# Patient Record
Sex: MALE | Race: BLACK OR AFRICAN AMERICAN | NOT HISPANIC OR LATINO | Employment: OTHER | URBAN - NONMETROPOLITAN AREA
[De-identification: names, ages, dates, MRNs, and addresses within clinical notes are randomized per-mention and may not be internally consistent; named-entity substitution may affect disease eponyms.]

---

## 2017-06-06 ENCOUNTER — HOSPITAL ENCOUNTER (EMERGENCY)
Facility: HOSPITAL | Age: 47
Discharge: HOME | End: 2017-06-06
Admitting: EMERGENCY MEDICINE

## 2017-06-06 VITALS
WEIGHT: 244 LBS | SYSTOLIC BLOOD PRESSURE: 153 MMHG | DIASTOLIC BLOOD PRESSURE: 90 MMHG | HEART RATE: 96 BPM | HEIGHT: 74 IN | BODY MASS INDEX: 31.32 KG/M2 | TEMPERATURE: 98.5 F | RESPIRATION RATE: 18 BRPM | OXYGEN SATURATION: 96 %

## 2017-06-06 DIAGNOSIS — S01.01XA SCALP LACERATION, INITIAL ENCOUNTER: Primary | ICD-10-CM

## 2017-06-06 PROCEDURE — 90715 TDAP VACCINE 7 YRS/> IM: CPT | Performed by: NURSE PRACTITIONER

## 2017-06-06 PROCEDURE — 25010000002 TDAP 5-2.5-18.5 LF-MCG/0.5 SUSPENSION: Performed by: NURSE PRACTITIONER

## 2017-06-06 PROCEDURE — 90471 IMMUNIZATION ADMIN: CPT | Performed by: NURSE PRACTITIONER

## 2017-06-06 PROCEDURE — 99282 EMERGENCY DEPT VISIT SF MDM: CPT

## 2017-06-06 RX ORDER — CEPHALEXIN 500 MG/1
500 CAPSULE ORAL 3 TIMES DAILY
Qty: 21 CAPSULE | Refills: 0 | Status: SHIPPED | OUTPATIENT
Start: 2017-06-06 | End: 2017-06-13

## 2017-06-06 RX ORDER — LIDOCAINE HYDROCHLORIDE AND EPINEPHRINE BITARTRATE 20; .01 MG/ML; MG/ML
10 INJECTION, SOLUTION SUBCUTANEOUS ONCE
Status: COMPLETED | OUTPATIENT
Start: 2017-06-06 | End: 2017-06-06

## 2017-06-06 RX ORDER — OXYCODONE AND ACETAMINOPHEN 7.5; 325 MG/1; MG/1
1 TABLET ORAL EVERY 4 HOURS PRN
Qty: 12 TABLET | Refills: 0 | Status: SHIPPED | OUTPATIENT
Start: 2017-06-06

## 2017-06-06 RX ADMIN — LIDOCAINE HYDROCHLORIDE,EPINEPHRINE BITARTRATE 10 ML: 20; .01 INJECTION, SOLUTION INFILTRATION; PERINEURAL at 15:47

## 2017-06-06 RX ADMIN — TETANUS TOXOID, REDUCED DIPHTHERIA TOXOID AND ACELLULAR PERTUSSIS VACCINE, ADSORBED 0.5 ML: 5; 2.5; 8; 8; 2.5 SUSPENSION INTRAMUSCULAR at 16:29

## 2017-06-06 NOTE — ED PROVIDER NOTES
Subjective   HPI Comments: Patient is a 47-year-old white male presents with laceration posterior head just prior to arrival.  He states he hit his head on a piece of tin and sustained a laceration. No loc    Patient is a 47 y.o. male presenting with skin laceration.   History provided by:  Patient   used: No    Laceration       Review of Systems   Constitutional: Negative.    HENT: Negative.    Eyes: Negative.    Respiratory: Negative.    Cardiovascular: Negative.    Gastrointestinal: Negative.    Endocrine: Negative.    Genitourinary: Negative.    Musculoskeletal: Negative.    Skin:        Pt presents after he hit his head on piece of tin just pta. He sustained laceration to posterior scalp. He denies loc or other injury   Allergic/Immunologic: Negative.    Neurological: Negative.    Hematological: Negative.    Psychiatric/Behavioral: Negative.    All other systems reviewed and are negative.      Past Medical History:   Diagnosis Date   • Carpal tunnel syndrome, bilateral     upper limbs   • Chronic back pain    • Cigarette nicotine dependence, uncomplicated    • Hypertension    • Obesity    • Overweight        No Known Allergies    Past Surgical History:   Procedure Laterality Date   • CLUB FOOT RELEASE     • KNEE ARTHROSCOPY Right        History reviewed. No pertinent family history.    Social History     Social History   • Marital status: Single     Spouse name: N/A   • Number of children: N/A   • Years of education: N/A     Social History Main Topics   • Smoking status: Current Every Day Smoker     Packs/day: 1.00   • Smokeless tobacco: None   • Alcohol use No   • Drug use: No   • Sexual activity: Defer     Other Topics Concern   • None     Social History Narrative   • None       Prior to Admission medications    Medication Sig Start Date End Date Taking? Authorizing Provider   Elastic Bandages & Supports [place]WRIST SPLINT[place] [facility] [place]     Historical Provider, MD       /90  Pulse 96  Temp 98.5  "°F (36.9 °C)  Resp 18  Ht 74\" (188 cm)  Wt 244 lb (111 kg)  SpO2 96%  BMI 31.33 kg/m2    Objective   Physical Exam   Constitutional: He is oriented to person, place, and time. He appears well-developed and well-nourished.   HENT:   Head: Normocephalic and atraumatic.   Irregular laceration to posterior scalp measures approx 2.5cm. Moderate amt of skin loss noted. Bleeding controlled   Eyes: Conjunctivae and EOM are normal. Pupils are equal, round, and reactive to light.   Neck: Normal range of motion. Neck supple.   Cardiovascular: Normal rate, regular rhythm, normal heart sounds and intact distal pulses.    Pulmonary/Chest: Effort normal and breath sounds normal.   Abdominal: Soft. Bowel sounds are normal.   Musculoskeletal: Normal range of motion.   Neurological: He is alert and oriented to person, place, and time. He has normal reflexes.   Skin: Skin is warm and dry.   Psychiatric: He has a normal mood and affect. His behavior is normal. Judgment and thought content normal.   Nursing note and vitals reviewed.      Laceration Repair  Date/Time: 6/6/2017 3:45 PM  Performed by: CARLOS HINES  Authorized by: CARLOS HINES   Consent: Verbal consent obtained. Written consent obtained.  Risks and benefits: risks, benefits and alternatives were discussed  Consent given by: patient  Patient understanding: patient states understanding of the procedure being performed  Patient consent: the patient's understanding of the procedure matches consent given  Procedure consent: procedure consent matches procedure scheduled  Relevant documents: relevant documents present and verified  Test results: test results available and properly labeled  Site marked: the operative site was marked  Imaging studies: imaging studies available  Patient identity confirmed: verbally with patient and arm band  Body area: head/neck  Location details: scalp  Laceration length: 2.5 cm  Tendon involvement: none  Nerve involvement: " none  Vascular damage: no  Anesthesia: local infiltration    Anesthesia:  Anesthesia: local infiltration  Local Anesthetic: lidocaine 1% with epinephrine   Anesthetic total: 4 mL  Sedation:  Patient sedated: no    Preparation: Patient was prepped and draped in the usual sterile fashion.  Irrigation solution: saline (hibiclens )  Amount of cleaning: standard (laceration irregular shaped with moderate tissue loss noted. )  Debridement: none  Degree of undermining: none  Number of sutures: 6  Technique: simple  Approximation: loose  Approximation difficulty: simple  Dressing: bacitiraicin/adaptic/bere.  Patient tolerance: Patient tolerated the procedure well with no immediate complications               Lab Results (last 24 hours)     ** No results found for the last 24 hours. **          No orders to display       ED Course  ED Course   Comment By Time   Antione report completed #[id number]. No signs of suspicious activity noted. Will write for small amt of pain medication for acute pain. Reviewed side effects and potential for abuse BROOKLYN Noriega 06/06 1620          MDM  Number of Diagnoses or Management Options  Scalp laceration, initial encounter: minor  Patient Progress  Patient progress: stable      Final diagnoses:   Scalp laceration, initial encounter          BROOKLYN Noriega  06/06/17 5165

## 2017-06-06 NOTE — ED NOTES
Patients head laceration cleaned with surgical scrub and sterile saline.  Small area on left hand clean and bacitracin applied.     Dahlia Martinez RN  06/06/17 0208

## 2017-06-06 NOTE — ED NOTES
Patient reports hitting his head on a piece of tin.  There is a laceration present on the back of his head approximately 3/4 in wide and 1/2 in tall.     Dahlia Martinez RN  06/06/17 9266

## 2017-06-14 ENCOUNTER — HOSPITAL ENCOUNTER (EMERGENCY)
Facility: HOSPITAL | Age: 47
Discharge: HOME | End: 2017-06-14
Admitting: NURSE PRACTITIONER

## 2017-06-14 VITALS
OXYGEN SATURATION: 97 % | WEIGHT: 240 LBS | RESPIRATION RATE: 21 BRPM | BODY MASS INDEX: 30.8 KG/M2 | SYSTOLIC BLOOD PRESSURE: 147 MMHG | TEMPERATURE: 98.4 F | HEART RATE: 94 BPM | HEIGHT: 74 IN | DIASTOLIC BLOOD PRESSURE: 89 MMHG

## 2017-06-14 DIAGNOSIS — Z48.02 VISIT FOR SUTURE REMOVAL: Primary | ICD-10-CM

## 2017-06-14 PROCEDURE — 99201: CPT

## 2017-06-15 NOTE — ED PROVIDER NOTES
Subjective   HPI Comments: Patient is 47-year-old white male presents here for suture removal from the scalp.  Laceration was repaired 8 days ago.  He has not had any complications since that time.    Patient is a 47 y.o. male presenting with suture removal.   History provided by:  Patient   used: No    Suture / Staple Removal       Review of Systems   Constitutional: Negative.    HENT: Negative.    Eyes: Negative.    Respiratory: Negative.    Cardiovascular: Negative.    Gastrointestinal: Negative.    Endocrine: Negative.    Genitourinary: Negative.    Musculoskeletal: Negative.    Skin:        Here for suture removal from scalp. Wound was repaired 8 days ago here in the er. He has not had any problems since wound repaired    Allergic/Immunologic: Negative.    Neurological: Negative.    Hematological: Negative.    Psychiatric/Behavioral: Negative.    All other systems reviewed and are negative.      Past Medical History:   Diagnosis Date   • Carpal tunnel syndrome, bilateral     upper limbs   • Chronic back pain    • Cigarette nicotine dependence, uncomplicated    • Hypertension    • Obesity    • Overweight        No Known Allergies    Past Surgical History:   Procedure Laterality Date   • CLUB FOOT RELEASE     • KNEE ARTHROSCOPY Right        History reviewed. No pertinent family history.    Social History     Social History   • Marital status: Single     Spouse name: N/A   • Number of children: N/A   • Years of education: N/A     Social History Main Topics   • Smoking status: Current Every Day Smoker     Packs/day: 1.00   • Smokeless tobacco: None   • Alcohol use No   • Drug use: No   • Sexual activity: Defer     Other Topics Concern   • None     Social History Narrative       Prior to Admission medications    Medication Sig Start Date End Date Taking? Authorizing Provider   cephalexin (KEFLEX) 500 MG capsule Take 1 capsule by mouth 3 (Three) Times a Day for 7 days. 6/6/17 6/13/17  Sonya Peralta  "BROOKLYN Bains   Elastic Bandages & Supports [place]ST SPLINT[place] [facility] [place]     Historical Provider, MD   oxyCODONE-acetaminophen (PERCOCET) 7.5-325 MG per tablet Take 1 tablet by mouth Every 4 (Four) Hours As Needed for Moderate Pain (4-6). 6/6/17   BROOKLYN Noriega       /89 (BP Location: Left arm, Patient Position: Sitting)  Pulse 94  Temp 98.4 °F (36.9 °C) (Temporal Artery )   Resp 21  Ht 74\" (188 cm)  Wt 240 lb (109 kg)  SpO2 97%  BMI 30.81 kg/m2    Objective   Physical Exam   Constitutional: He is oriented to person, place, and time. He appears well-developed and well-nourished.   HENT:   Head: Normocephalic and atraumatic.   Wound to posterior scalp- healed well. Mild scabbing noted. No signs of infection noted.    Eyes: EOM are normal.   Neck: Normal range of motion. Neck supple.   Cardiovascular: Normal rate, regular rhythm, normal heart sounds and intact distal pulses.    Pulmonary/Chest: Effort normal and breath sounds normal.   Musculoskeletal: Normal range of motion.   Neurological: He is alert and oriented to person, place, and time. He has normal reflexes.   Skin: Skin is warm and dry.   Psychiatric: He has a normal mood and affect. His behavior is normal. Judgment and thought content normal.   Nursing note and vitals reviewed.      Procedures         Lab Results (last 24 hours)     ** No results found for the last 24 hours. **          No orders to display       ED Course  ED Course          MDM  Number of Diagnoses or Management Options  Visit for suture removal: minor  Patient Progress  Patient progress: stable      Final diagnoses:   Visit for suture removal          BROOKLYN Noriega  06/15/17 1312    "

## 2017-08-31 ENCOUNTER — HOSPITAL ENCOUNTER (INPATIENT)
Age: 47
LOS: 2 days | Discharge: HOME | DRG: 203 | End: 2017-09-02
Attending: EMERGENCY MEDICINE | Admitting: FAMILY MEDICINE
Payer: MEDICARE

## 2017-08-31 ENCOUNTER — APPOINTMENT (OUTPATIENT)
Dept: GENERAL RADIOLOGY | Age: 47
DRG: 203 | End: 2017-08-31
Payer: MEDICARE

## 2017-08-31 DIAGNOSIS — J18.9 PNEUMONIA OF RIGHT LOWER LOBE DUE TO INFECTIOUS ORGANISM: Primary | ICD-10-CM

## 2017-08-31 DIAGNOSIS — R09.02 HYPOXIA: ICD-10-CM

## 2017-08-31 LAB
ALBUMIN SERPL-MCNC: 4.3 G/DL (ref 3.5–5.2)
ALP BLD-CCNC: 92 U/L (ref 40–130)
ALT SERPL-CCNC: 19 U/L (ref 5–41)
ANION GAP SERPL CALCULATED.3IONS-SCNC: 13 MMOL/L (ref 7–19)
AST SERPL-CCNC: 26 U/L (ref 5–40)
BASOPHILS ABSOLUTE: 0 K/UL (ref 0–0.2)
BASOPHILS RELATIVE PERCENT: 0.3 % (ref 0–1)
BILIRUB SERPL-MCNC: 0.5 MG/DL (ref 0.2–1.2)
BUN BLDV-MCNC: 15 MG/DL (ref 6–20)
CALCIUM SERPL-MCNC: 9 MG/DL (ref 8.6–10)
CHLORIDE BLD-SCNC: 103 MMOL/L (ref 98–111)
CO2: 26 MMOL/L (ref 22–29)
CREAT SERPL-MCNC: 1 MG/DL (ref 0.5–1.2)
D DIMER: <0.27 NG/ML DDU (ref 0–0.48)
EOSINOPHILS ABSOLUTE: 0.2 K/UL (ref 0–0.6)
EOSINOPHILS RELATIVE PERCENT: 3 % (ref 0–5)
GFR NON-AFRICAN AMERICAN: >60
GLUCOSE BLD-MCNC: 83 MG/DL (ref 74–109)
HCT VFR BLD CALC: 40.4 % (ref 42–52)
HEMOGLOBIN: 13.7 G/DL (ref 14–18)
LYMPHOCYTES ABSOLUTE: 3 K/UL (ref 1.1–4.5)
LYMPHOCYTES RELATIVE PERCENT: 38.9 % (ref 20–40)
MCH RBC QN AUTO: 30.6 PG (ref 27–31)
MCHC RBC AUTO-ENTMCNC: 33.9 G/DL (ref 33–37)
MCV RBC AUTO: 90.2 FL (ref 80–94)
MONOCYTES ABSOLUTE: 0.6 K/UL (ref 0–0.9)
MONOCYTES RELATIVE PERCENT: 7.9 % (ref 0–10)
NEUTROPHILS ABSOLUTE: 3.8 K/UL (ref 1.5–7.5)
NEUTROPHILS RELATIVE PERCENT: 49.8 % (ref 50–65)
PDW BLD-RTO: 13.3 % (ref 11.5–14.5)
PLATELET # BLD: 168 K/UL (ref 130–400)
PMV BLD AUTO: 10 FL (ref 9.4–12.4)
POTASSIUM SERPL-SCNC: 4.2 MMOL/L (ref 3.5–5)
RBC # BLD: 4.48 M/UL (ref 4.7–6.1)
SODIUM BLD-SCNC: 142 MMOL/L (ref 136–145)
TOTAL PROTEIN: 6.9 G/DL (ref 6.6–8.7)
WBC # BLD: 7.6 K/UL (ref 4.8–10.8)

## 2017-08-31 PROCEDURE — 6360000002 HC RX W HCPCS: Performed by: NURSE PRACTITIONER

## 2017-08-31 PROCEDURE — 94762 N-INVAS EAR/PLS OXIMTRY CONT: CPT

## 2017-08-31 PROCEDURE — 99285 EMERGENCY DEPT VISIT HI MDM: CPT

## 2017-08-31 PROCEDURE — 93005 ELECTROCARDIOGRAM TRACING: CPT

## 2017-08-31 PROCEDURE — 87040 BLOOD CULTURE FOR BACTERIA: CPT

## 2017-08-31 PROCEDURE — 6360000002 HC RX W HCPCS: Performed by: EMERGENCY MEDICINE

## 2017-08-31 PROCEDURE — 2580000003 HC RX 258: Performed by: FAMILY MEDICINE

## 2017-08-31 PROCEDURE — 85379 FIBRIN DEGRADATION QUANT: CPT

## 2017-08-31 PROCEDURE — 71010 XR CHEST PORTABLE: CPT

## 2017-08-31 PROCEDURE — 2580000003 HC RX 258: Performed by: EMERGENCY MEDICINE

## 2017-08-31 PROCEDURE — 94640 AIRWAY INHALATION TREATMENT: CPT

## 2017-08-31 PROCEDURE — 6370000000 HC RX 637 (ALT 250 FOR IP): Performed by: NURSE PRACTITIONER

## 2017-08-31 PROCEDURE — 99284 EMERGENCY DEPT VISIT MOD MDM: CPT | Performed by: EMERGENCY MEDICINE

## 2017-08-31 PROCEDURE — 2700000000 HC OXYGEN THERAPY PER DAY

## 2017-08-31 PROCEDURE — 6360000002 HC RX W HCPCS: Performed by: FAMILY MEDICINE

## 2017-08-31 PROCEDURE — 6370000000 HC RX 637 (ALT 250 FOR IP): Performed by: FAMILY MEDICINE

## 2017-08-31 PROCEDURE — 96374 THER/PROPH/DIAG INJ IV PUSH: CPT

## 2017-08-31 PROCEDURE — 85025 COMPLETE CBC W/AUTO DIFF WBC: CPT

## 2017-08-31 PROCEDURE — 80053 COMPREHEN METABOLIC PANEL: CPT

## 2017-08-31 PROCEDURE — 1210000000 HC MED SURG R&B

## 2017-08-31 PROCEDURE — 36415 COLL VENOUS BLD VENIPUNCTURE: CPT

## 2017-08-31 RX ORDER — AMLODIPINE BESYLATE 10 MG/1
10 TABLET ORAL DAILY
Status: DISCONTINUED | OUTPATIENT
Start: 2017-08-31 | End: 2017-09-02 | Stop reason: HOSPADM

## 2017-08-31 RX ORDER — ALBUTEROL SULFATE 2.5 MG/3ML
2.5 SOLUTION RESPIRATORY (INHALATION) EVERY 6 HOURS PRN
Status: DISCONTINUED | OUTPATIENT
Start: 2017-08-31 | End: 2017-09-02 | Stop reason: HOSPADM

## 2017-08-31 RX ORDER — IPRATROPIUM BROMIDE AND ALBUTEROL SULFATE 2.5; .5 MG/3ML; MG/3ML
1 SOLUTION RESPIRATORY (INHALATION) ONCE
Status: COMPLETED | OUTPATIENT
Start: 2017-08-31 | End: 2017-08-31

## 2017-08-31 RX ORDER — METHYLPREDNISOLONE SODIUM SUCCINATE 125 MG/2ML
125 INJECTION, POWDER, LYOPHILIZED, FOR SOLUTION INTRAMUSCULAR; INTRAVENOUS ONCE
Status: COMPLETED | OUTPATIENT
Start: 2017-08-31 | End: 2017-08-31

## 2017-08-31 RX ORDER — METHYLPREDNISOLONE SODIUM SUCCINATE 40 MG/ML
40 INJECTION, POWDER, LYOPHILIZED, FOR SOLUTION INTRAMUSCULAR; INTRAVENOUS EVERY 6 HOURS
Status: DISCONTINUED | OUTPATIENT
Start: 2017-08-31 | End: 2017-09-01

## 2017-08-31 RX ORDER — FAMOTIDINE 10 MG
10 TABLET ORAL 2 TIMES DAILY
COMMUNITY

## 2017-08-31 RX ORDER — HYDROCODONE BITARTRATE AND ACETAMINOPHEN 10; 325 MG/1; MG/1
1 TABLET ORAL EVERY 6 HOURS PRN
COMMUNITY

## 2017-08-31 RX ORDER — HYDROCODONE BITARTRATE AND ACETAMINOPHEN 10; 325 MG/1; MG/1
1 TABLET ORAL EVERY 6 HOURS PRN
Status: DISCONTINUED | OUTPATIENT
Start: 2017-08-31 | End: 2017-09-02 | Stop reason: HOSPADM

## 2017-08-31 RX ORDER — IPRATROPIUM BROMIDE AND ALBUTEROL SULFATE 2.5; .5 MG/3ML; MG/3ML
1 SOLUTION RESPIRATORY (INHALATION) 4 TIMES DAILY
Status: DISCONTINUED | OUTPATIENT
Start: 2017-08-31 | End: 2017-09-02 | Stop reason: HOSPADM

## 2017-08-31 RX ORDER — AMLODIPINE BESYLATE 10 MG/1
10 TABLET ORAL DAILY
COMMUNITY

## 2017-08-31 RX ORDER — FAMOTIDINE 20 MG/1
10 TABLET, FILM COATED ORAL 2 TIMES DAILY
Status: DISCONTINUED | OUTPATIENT
Start: 2017-08-31 | End: 2017-09-02 | Stop reason: HOSPADM

## 2017-08-31 RX ORDER — SODIUM CHLORIDE 0.9 % (FLUSH) 0.9 %
10 SYRINGE (ML) INJECTION EVERY 12 HOURS SCHEDULED
Status: DISCONTINUED | OUTPATIENT
Start: 2017-08-31 | End: 2017-09-02 | Stop reason: HOSPADM

## 2017-08-31 RX ORDER — SODIUM CHLORIDE 0.9 % (FLUSH) 0.9 %
10 SYRINGE (ML) INJECTION PRN
Status: DISCONTINUED | OUTPATIENT
Start: 2017-08-31 | End: 2017-09-02 | Stop reason: HOSPADM

## 2017-08-31 RX ORDER — ACETAMINOPHEN 325 MG/1
650 TABLET ORAL EVERY 4 HOURS PRN
Status: DISCONTINUED | OUTPATIENT
Start: 2017-08-31 | End: 2017-09-02 | Stop reason: HOSPADM

## 2017-08-31 RX ORDER — ASPIRIN 81 MG/1
81 TABLET, CHEWABLE ORAL DAILY
Status: DISCONTINUED | OUTPATIENT
Start: 2017-08-31 | End: 2017-09-02 | Stop reason: HOSPADM

## 2017-08-31 RX ADMIN — Medication 10 ML: at 09:07

## 2017-08-31 RX ADMIN — METHYLPREDNISOLONE SODIUM SUCCINATE 125 MG: 125 INJECTION, POWDER, FOR SOLUTION INTRAMUSCULAR; INTRAVENOUS at 03:07

## 2017-08-31 RX ADMIN — IPRATROPIUM BROMIDE AND ALBUTEROL SULFATE 1 AMPULE: .5; 3 SOLUTION RESPIRATORY (INHALATION) at 10:05

## 2017-08-31 RX ADMIN — IPRATROPIUM BROMIDE AND ALBUTEROL SULFATE 1 AMPULE: .5; 3 SOLUTION RESPIRATORY (INHALATION) at 03:02

## 2017-08-31 RX ADMIN — IPRATROPIUM BROMIDE AND ALBUTEROL SULFATE 1 AMPULE: .5; 3 SOLUTION RESPIRATORY (INHALATION) at 19:01

## 2017-08-31 RX ADMIN — Medication 10 ML: at 20:57

## 2017-08-31 RX ADMIN — METHYLPREDNISOLONE SODIUM SUCCINATE 40 MG: 40 INJECTION, POWDER, FOR SOLUTION INTRAMUSCULAR; INTRAVENOUS at 20:57

## 2017-08-31 RX ADMIN — ASPIRIN 81 MG CHEWABLE TABLET 81 MG: 81 TABLET CHEWABLE at 21:09

## 2017-08-31 RX ADMIN — FAMOTIDINE 10 MG: 20 TABLET ORAL at 21:09

## 2017-08-31 RX ADMIN — AMLODIPINE BESYLATE 10 MG: 10 TABLET ORAL at 21:09

## 2017-08-31 RX ADMIN — ALBUTEROL SULFATE 2.5 MG: 2.5 SOLUTION RESPIRATORY (INHALATION) at 06:44

## 2017-08-31 RX ADMIN — IPRATROPIUM BROMIDE AND ALBUTEROL SULFATE 1 AMPULE: .5; 3 SOLUTION RESPIRATORY (INHALATION) at 02:46

## 2017-08-31 RX ADMIN — CEFTRIAXONE SODIUM 1 G: 1 INJECTION, POWDER, FOR SOLUTION INTRAMUSCULAR; INTRAVENOUS at 05:29

## 2017-08-31 RX ADMIN — IPRATROPIUM BROMIDE AND ALBUTEROL SULFATE 1 AMPULE: .5; 3 SOLUTION RESPIRATORY (INHALATION) at 14:43

## 2017-08-31 RX ADMIN — METHYLPREDNISOLONE SODIUM SUCCINATE 40 MG: 40 INJECTION, POWDER, FOR SOLUTION INTRAMUSCULAR; INTRAVENOUS at 14:54

## 2017-08-31 RX ADMIN — METHYLPREDNISOLONE SODIUM SUCCINATE 40 MG: 40 INJECTION, POWDER, FOR SOLUTION INTRAMUSCULAR; INTRAVENOUS at 09:06

## 2017-08-31 RX ADMIN — AZITHROMYCIN MONOHYDRATE 500 MG: 500 INJECTION, POWDER, LYOPHILIZED, FOR SOLUTION INTRAVENOUS at 04:10

## 2017-08-31 ASSESSMENT — ENCOUNTER SYMPTOMS
COUGH: 1
CHEST TIGHTNESS: 0
SHORTNESS OF BREATH: 1
EYES NEGATIVE: 1
WHEEZING: 1
GASTROINTESTINAL NEGATIVE: 1

## 2017-08-31 ASSESSMENT — PAIN SCALES - GENERAL: PAINLEVEL_OUTOF10: 7

## 2017-09-01 PROCEDURE — 94762 N-INVAS EAR/PLS OXIMTRY CONT: CPT

## 2017-09-01 PROCEDURE — 6370000000 HC RX 637 (ALT 250 FOR IP): Performed by: FAMILY MEDICINE

## 2017-09-01 PROCEDURE — 6360000002 HC RX W HCPCS: Performed by: FAMILY MEDICINE

## 2017-09-01 PROCEDURE — 2580000003 HC RX 258: Performed by: FAMILY MEDICINE

## 2017-09-01 PROCEDURE — 1210000000 HC MED SURG R&B

## 2017-09-01 PROCEDURE — 94640 AIRWAY INHALATION TREATMENT: CPT

## 2017-09-01 PROCEDURE — 2700000000 HC OXYGEN THERAPY PER DAY

## 2017-09-01 RX ORDER — LISINOPRIL 20 MG/1
20 TABLET ORAL DAILY
Status: DISCONTINUED | OUTPATIENT
Start: 2017-09-01 | End: 2017-09-02 | Stop reason: HOSPADM

## 2017-09-01 RX ORDER — METHYLPREDNISOLONE SODIUM SUCCINATE 40 MG/ML
40 INJECTION, POWDER, LYOPHILIZED, FOR SOLUTION INTRAMUSCULAR; INTRAVENOUS EVERY 12 HOURS
Status: DISCONTINUED | OUTPATIENT
Start: 2017-09-02 | End: 2017-09-02 | Stop reason: HOSPADM

## 2017-09-01 RX ADMIN — Medication 10 ML: at 09:21

## 2017-09-01 RX ADMIN — Medication 10 ML: at 21:21

## 2017-09-01 RX ADMIN — METHYLPREDNISOLONE SODIUM SUCCINATE 40 MG: 40 INJECTION, POWDER, FOR SOLUTION INTRAMUSCULAR; INTRAVENOUS at 14:38

## 2017-09-01 RX ADMIN — METHYLPREDNISOLONE SODIUM SUCCINATE 40 MG: 40 INJECTION, POWDER, FOR SOLUTION INTRAMUSCULAR; INTRAVENOUS at 09:21

## 2017-09-01 RX ADMIN — METHYLPREDNISOLONE SODIUM SUCCINATE 40 MG: 40 INJECTION, POWDER, FOR SOLUTION INTRAMUSCULAR; INTRAVENOUS at 03:11

## 2017-09-01 RX ADMIN — IPRATROPIUM BROMIDE AND ALBUTEROL SULFATE 1 AMPULE: .5; 3 SOLUTION RESPIRATORY (INHALATION) at 07:48

## 2017-09-01 RX ADMIN — FAMOTIDINE 10 MG: 20 TABLET ORAL at 21:21

## 2017-09-01 RX ADMIN — FAMOTIDINE 10 MG: 20 TABLET ORAL at 09:21

## 2017-09-01 RX ADMIN — IPRATROPIUM BROMIDE AND ALBUTEROL SULFATE 1 AMPULE: .5; 3 SOLUTION RESPIRATORY (INHALATION) at 15:32

## 2017-09-01 RX ADMIN — ASPIRIN 81 MG CHEWABLE TABLET 81 MG: 81 TABLET CHEWABLE at 09:20

## 2017-09-01 RX ADMIN — IPRATROPIUM BROMIDE AND ALBUTEROL SULFATE 1 AMPULE: .5; 3 SOLUTION RESPIRATORY (INHALATION) at 11:06

## 2017-09-01 RX ADMIN — CEFTRIAXONE SODIUM 1 G: 1 INJECTION, POWDER, FOR SOLUTION INTRAMUSCULAR; INTRAVENOUS at 03:12

## 2017-09-01 RX ADMIN — AMLODIPINE BESYLATE 10 MG: 10 TABLET ORAL at 09:21

## 2017-09-01 RX ADMIN — AZITHROMYCIN MONOHYDRATE 500 MG: 500 INJECTION, POWDER, LYOPHILIZED, FOR SOLUTION INTRAVENOUS at 03:45

## 2017-09-01 RX ADMIN — IPRATROPIUM BROMIDE AND ALBUTEROL SULFATE 1 AMPULE: .5; 3 SOLUTION RESPIRATORY (INHALATION) at 18:41

## 2017-09-01 RX ADMIN — LISINOPRIL 20 MG: 20 TABLET ORAL at 21:23

## 2017-09-02 VITALS
OXYGEN SATURATION: 95 % | DIASTOLIC BLOOD PRESSURE: 80 MMHG | TEMPERATURE: 98.3 F | BODY MASS INDEX: 29.12 KG/M2 | HEART RATE: 99 BPM | SYSTOLIC BLOOD PRESSURE: 127 MMHG | HEIGHT: 74 IN | WEIGHT: 226.9 LBS | RESPIRATION RATE: 18 BRPM

## 2017-09-02 PROBLEM — I10 ESSENTIAL HYPERTENSION: Chronic | Status: ACTIVE | Noted: 2017-09-02

## 2017-09-02 PROBLEM — J45.901 ASTHMA EXACERBATION: Status: ACTIVE | Noted: 2017-09-02

## 2017-09-02 LAB
ANION GAP SERPL CALCULATED.3IONS-SCNC: 13 MMOL/L (ref 7–19)
BUN BLDV-MCNC: 16 MG/DL (ref 6–20)
CALCIUM SERPL-MCNC: 9.1 MG/DL (ref 8.6–10)
CHLORIDE BLD-SCNC: 105 MMOL/L (ref 98–111)
CO2: 25 MMOL/L (ref 22–29)
CREAT SERPL-MCNC: 0.9 MG/DL (ref 0.5–1.2)
GFR NON-AFRICAN AMERICAN: >60
GLUCOSE BLD-MCNC: 123 MG/DL (ref 74–109)
POTASSIUM SERPL-SCNC: 4.3 MMOL/L (ref 3.5–5)
SODIUM BLD-SCNC: 143 MMOL/L (ref 136–145)

## 2017-09-02 PROCEDURE — 2580000003 HC RX 258: Performed by: FAMILY MEDICINE

## 2017-09-02 PROCEDURE — 80048 BASIC METABOLIC PNL TOTAL CA: CPT

## 2017-09-02 PROCEDURE — 6370000000 HC RX 637 (ALT 250 FOR IP): Performed by: FAMILY MEDICINE

## 2017-09-02 PROCEDURE — 94640 AIRWAY INHALATION TREATMENT: CPT

## 2017-09-02 PROCEDURE — 36415 COLL VENOUS BLD VENIPUNCTURE: CPT

## 2017-09-02 PROCEDURE — 6360000002 HC RX W HCPCS: Performed by: FAMILY MEDICINE

## 2017-09-02 PROCEDURE — 2700000000 HC OXYGEN THERAPY PER DAY

## 2017-09-02 RX ORDER — ALBUTEROL SULFATE 90 UG/1
2 AEROSOL, METERED RESPIRATORY (INHALATION) EVERY 6 HOURS PRN
Qty: 1 INHALER | Refills: 3 | Status: SHIPPED | OUTPATIENT
Start: 2017-09-02

## 2017-09-02 RX ORDER — PREDNISONE 10 MG/1
TABLET ORAL
Qty: 14 TABLET | Refills: 0 | Status: SHIPPED | OUTPATIENT
Start: 2017-09-02

## 2017-09-02 RX ORDER — CEFPROZIL 500 MG/1
500 TABLET, FILM COATED ORAL 2 TIMES DAILY
Qty: 10 TABLET | Refills: 0 | Status: SHIPPED | OUTPATIENT
Start: 2017-09-02 | End: 2017-09-07

## 2017-09-02 RX ORDER — LISINOPRIL 20 MG/1
20 TABLET ORAL DAILY
Qty: 30 TABLET | Refills: 3 | Status: SHIPPED | OUTPATIENT
Start: 2017-09-02

## 2017-09-02 RX ADMIN — AZITHROMYCIN MONOHYDRATE 500 MG: 500 INJECTION, POWDER, LYOPHILIZED, FOR SOLUTION INTRAVENOUS at 04:12

## 2017-09-02 RX ADMIN — LISINOPRIL 20 MG: 20 TABLET ORAL at 08:16

## 2017-09-02 RX ADMIN — IPRATROPIUM BROMIDE AND ALBUTEROL SULFATE 1 AMPULE: .5; 3 SOLUTION RESPIRATORY (INHALATION) at 07:04

## 2017-09-02 RX ADMIN — ASPIRIN 81 MG CHEWABLE TABLET 81 MG: 81 TABLET CHEWABLE at 08:15

## 2017-09-02 RX ADMIN — IPRATROPIUM BROMIDE AND ALBUTEROL SULFATE 1 AMPULE: .5; 3 SOLUTION RESPIRATORY (INHALATION) at 10:58

## 2017-09-02 RX ADMIN — METHYLPREDNISOLONE SODIUM SUCCINATE 40 MG: 40 INJECTION, POWDER, FOR SOLUTION INTRAMUSCULAR; INTRAVENOUS at 04:12

## 2017-09-02 RX ADMIN — AMLODIPINE BESYLATE 10 MG: 10 TABLET ORAL at 08:16

## 2017-09-02 RX ADMIN — Medication 10 ML: at 08:16

## 2017-09-02 RX ADMIN — FAMOTIDINE 10 MG: 20 TABLET ORAL at 08:16

## 2017-09-02 RX ADMIN — CEFTRIAXONE SODIUM 1 G: 1 INJECTION, POWDER, FOR SOLUTION INTRAMUSCULAR; INTRAVENOUS at 04:12

## 2017-09-05 LAB
BLOOD CULTURE, ROUTINE: NORMAL
CULTURE, BLOOD 2: NORMAL

## 2017-09-06 LAB
EKG P AXIS: 69 DEGREES
EKG P-R INTERVAL: 184 MS
EKG Q-T INTERVAL: 314 MS
EKG QRS DURATION: 83 MS
EKG QTC CALCULATION (BAZETT): 421 MS
EKG T AXIS: 30 DEGREES

## 2020-07-01 ENCOUNTER — HOSPITAL ENCOUNTER (OUTPATIENT)
Dept: NEUROLOGY | Age: 50
Discharge: HOME | End: 2020-07-01
Payer: MEDICARE

## 2020-07-01 PROCEDURE — 95910 NRV CNDJ TEST 7-8 STUDIES: CPT | Performed by: PSYCHIATRY & NEUROLOGY

## 2020-07-01 PROCEDURE — 95886 MUSC TEST DONE W/N TEST COMP: CPT

## 2020-07-01 PROCEDURE — 95886 MUSC TEST DONE W/N TEST COMP: CPT | Performed by: PSYCHIATRY & NEUROLOGY

## 2020-07-01 PROCEDURE — 95910 NRV CNDJ TEST 7-8 STUDIES: CPT

## 2021-05-08 ENCOUNTER — HOSPITAL ENCOUNTER (EMERGENCY)
Facility: HOSPITAL | Age: 51
Discharge: HOME | End: 2021-05-08
Attending: EMERGENCY MEDICINE | Admitting: EMERGENCY MEDICINE

## 2021-05-08 ENCOUNTER — APPOINTMENT (OUTPATIENT)
Dept: GENERAL RADIOLOGY | Facility: HOSPITAL | Age: 51
End: 2021-05-08

## 2021-05-08 VITALS
SYSTOLIC BLOOD PRESSURE: 156 MMHG | HEART RATE: 95 BPM | RESPIRATION RATE: 16 BRPM | TEMPERATURE: 98.2 F | BODY MASS INDEX: 28.49 KG/M2 | WEIGHT: 222 LBS | HEIGHT: 74 IN | DIASTOLIC BLOOD PRESSURE: 94 MMHG | OXYGEN SATURATION: 97 %

## 2021-05-08 DIAGNOSIS — S92.901A FOOT FRACTURE, RIGHT, CLOSED, INITIAL ENCOUNTER: Primary | ICD-10-CM

## 2021-05-08 LAB — GLUCOSE BLDC GLUCOMTR-MCNC: 120 MG/DL (ref 70–130)

## 2021-05-08 PROCEDURE — 82962 GLUCOSE BLOOD TEST: CPT

## 2021-05-08 PROCEDURE — 73630 X-RAY EXAM OF FOOT: CPT

## 2021-05-08 PROCEDURE — 99284 EMERGENCY DEPT VISIT MOD MDM: CPT

## 2021-05-08 PROCEDURE — 99283 EMERGENCY DEPT VISIT LOW MDM: CPT

## 2021-05-08 RX ORDER — ALBUTEROL SULFATE 90 UG/1
2 AEROSOL, METERED RESPIRATORY (INHALATION) EVERY 4 HOURS PRN
COMMUNITY

## 2021-05-08 RX ORDER — HYDROCODONE BITARTRATE AND ACETAMINOPHEN 5; 325 MG/1; MG/1
1 TABLET ORAL ONCE
Status: COMPLETED | OUTPATIENT
Start: 2021-05-08 | End: 2021-05-08

## 2021-05-08 RX ORDER — AMLODIPINE BESYLATE 10 MG/1
10 TABLET ORAL
COMMUNITY

## 2021-05-08 RX ORDER — NAPROXEN 500 MG/1
500 TABLET ORAL 2 TIMES DAILY PRN
Qty: 20 TABLET | Refills: 0 | OUTPATIENT
Start: 2021-05-08 | End: 2023-02-05

## 2021-05-08 RX ADMIN — HYDROCODONE BITARTRATE AND ACETAMINOPHEN 1 TABLET: 5; 325 TABLET ORAL at 14:00

## 2021-05-08 NOTE — ED PROVIDER NOTES
Subjective   51-year-old male presents to emergency department complaint of right foot pain.  Patient was at a local home-improvement store yesterday, states a gentleman operating a forklift accidentally dropped a stack of deck boards off the forklift and it landed on the patient's right foot.  States he had pretty significant pain initially.  Initially did not want to come to the emergency department but pain became more severe throughout yesterday evening and into the night which prompted him to the ER today.  He states his pain is worse with attempts at weightbearing, worse with palpation.  Did not sustain injury anywhere else.  Does have mild right knee pain but does not recall being struck on the knee.  Also complains of some nonspecific paresthesias to his hands and feet.  No history of diabetes, states he has been significantly anxious since the injury occurred yesterday.      History provided by:  Patient      Review of Systems   All other systems reviewed and are negative.      Past Medical History:   Diagnosis Date   • Carpal tunnel syndrome, bilateral     upper limbs   • Chronic back pain    • Cigarette nicotine dependence, uncomplicated    • Hypertension    • Obesity    • Overweight        No Known Allergies    Past Surgical History:   Procedure Laterality Date   • CLUB FOOT RELEASE     • KNEE ARTHROSCOPY Right        History reviewed. No pertinent family history.    Social History     Socioeconomic History   • Marital status: Single     Spouse name: Not on file   • Number of children: Not on file   • Years of education: Not on file   • Highest education level: Not on file   Tobacco Use   • Smoking status: Current Every Day Smoker     Packs/day: 1.00   Substance and Sexual Activity   • Alcohol use: No   • Drug use: No   • Sexual activity: Defer           Objective   Physical Exam  Vitals and nursing note reviewed.   Constitutional:       General: He is not in acute distress.     Appearance: Normal  appearance. He is not ill-appearing.   HENT:      Head: Normocephalic and atraumatic.   Eyes:      Extraocular Movements: Extraocular movements intact.      Conjunctiva/sclera: Conjunctivae normal.      Pupils: Pupils are equal, round, and reactive to light.   Cardiovascular:      Rate and Rhythm: Normal rate and regular rhythm.      Pulses: Normal pulses.      Heart sounds: Normal heart sounds. No murmur heard.   No friction rub. No gallop.    Pulmonary:      Effort: Pulmonary effort is normal.      Breath sounds: Normal breath sounds. No wheezing, rhonchi or rales.   Abdominal:      General: Abdomen is flat. Bowel sounds are normal. There is no distension.      Palpations: Abdomen is soft.      Tenderness: There is no abdominal tenderness. There is no guarding or rebound.   Musculoskeletal:         General: Swelling and tenderness present. No deformity. Normal range of motion.      Comments: Tender palpation along the dorsum of the patient's foot over the distal third through fifth metatarsals, there is mild swelling along the dorsum of the foot, no deformity, no bony crepitus   Skin:     General: Skin is warm and dry.      Capillary Refill: Capillary refill takes less than 2 seconds.      Findings: No rash.   Neurological:      General: No focal deficit present.      Mental Status: He is alert and oriented to person, place, and time. Mental status is at baseline.         Procedures           ED Course  ED Course as of May 08 1524   Sat May 08, 2021   1518 51-year-old male presents to the ED following foot injury.  X-ray appears to show horizontal fracture of its nondisplaced along the base of the fourth metatarsal.  Also has foot contusion.  Given his fracture, we will make the patient nonweightbearing, plus splint provide crutches and follow-up with orthopedic surgery as an outpatient.  Glucose essentially normal, doubt paresthesias related to diabetic neuropathy.    [AW]      ED Course User Index  [AW] Freida  Arnaldo Parsons MD                                           MDM  Number of Diagnoses or Management Options  Foot fracture, right, closed, initial encounter: new and requires workup     Amount and/or Complexity of Data Reviewed  Clinical lab tests: reviewed  Tests in the radiology section of CPT®: reviewed    Risk of Complications, Morbidity, and/or Mortality  Presenting problems: moderate  Diagnostic procedures: moderate  Management options: moderate    Patient Progress  Patient progress: improved      Final diagnoses:   Foot fracture, right, closed, initial encounter       ED Disposition  ED Disposition     ED Disposition Condition Comment    Discharge Stable           Scooter Francisco MD  [address] [place] [id number] [phone]    In 5 days           Medication List      New Prescriptions    naproxen 500 MG EC tablet  Commonly known as: EC NAPROSYN  Take 1 tablet by mouth 2 (Two) Times a Day As Needed for Mild Pain .           Where to Get Your Medications      These medications were sent to [id number] - [place], [place] - [address] AT ACROSS FROM [address] - [phone] [facility] [phone]   [address] [place] [address]    Hours: 24-hours Phone: [phone]   · naproxen 500 MG EC tablet          Arnaldo Guan MD  05/08/21 3180

## 2021-05-23 ENCOUNTER — TRANSCRIBE ORDERS (OUTPATIENT)
Dept: PHYSICAL THERAPY | Facility: CLINIC | Age: 51
End: 2021-05-23

## 2021-05-23 DIAGNOSIS — M54.2 CERVICALGIA: Primary | ICD-10-CM

## 2021-06-04 ENCOUNTER — TREATMENT (OUTPATIENT)
Dept: PHYSICAL THERAPY | Facility: CLINIC | Age: 51
End: 2021-06-04

## 2021-06-04 DIAGNOSIS — S16.1XXA STRAIN OF NECK MUSCLE, INITIAL ENCOUNTER: Primary | ICD-10-CM

## 2021-06-04 DIAGNOSIS — M79.671 RIGHT FOOT PAIN: ICD-10-CM

## 2021-06-04 DIAGNOSIS — M25.559 PAIN, HIP: ICD-10-CM

## 2021-06-04 PROCEDURE — 97162 PT EVAL MOD COMPLEX 30 MIN: CPT | Performed by: PHYSICAL THERAPIST

## 2021-06-04 NOTE — PROGRESS NOTES
"Physical Therapy Therapy Initial Evaluation and Plan of Care    Patient: Anthony Wong               : 1970  Visit Date: 2021  Referring practitioner: Dharmesh Berger DO  Date of Initial Visit: 2021  Patient seen for 1 sessions    Visit Diagnoses:    ICD-10-CM ICD-9-CM   1. Strain of neck muscle, initial encounter  S16.1XXA 847.0   2. Pain, hip  M25.559 719.45   3. Right foot pain  M79.671 729.5     Past Medical History:   Diagnosis Date   • Carpal tunnel syndrome, bilateral     upper limbs   • Chronic back pain    • Cigarette nicotine dependence, uncomplicated    • Hypertension    • Obesity    • Overweight      Past Surgical History:   Procedure Laterality Date   • CLUB FOOT RELEASE     • KNEE ARTHROSCOPY Right          SUBJECTIVE     Subjective Evaluation    History of Present Illness  Date of onset: 2021  Mechanism of injury: He was getting decking board wood at [employer]. He was going down the aisle with his cart. When he got to where the workers were, one of the workers had jumped onto a forklift and was holding the wood telling Martínez \"it's not going to fall\". The wood landed on Martínez's foot and he twisted around to get away from it. He had pain but the next morning, his neck was hurting. He went to the ER and there he was told he had a fracture of his 4th proximal metatarsal. He had other Xrays that didn't show a fracture. His right hip hurts on the lateral side which has worsened as he's walked on the boot.     Pain  Current pain ratin  At best pain ratin  At worst pain ratin  Location: neck when he turns, right foot  Quality: burning  Relieving factors: rest  Aggravating factors: ambulation (turning his neck)    Social Support  Lives in: one-story house  Lives with: alone    Diagnostic Tests  X-ray: abnormal (one showed hairline fracture 4th proximal metatarsal; other Xray was clear)    Patient Goals  Patient goals for therapy: decreased pain, increased motion and " "independence with ADLs/IADLs      Outcome Measure:   PT G-Codes  Outcome Measure Options: Neck Disability Index (NDI)  Neck Disability Index Score: 34 (68%)    OBJECTIVE     Objective          Palpation     Right   Hypertonic in the cervical paraspinals and suboccipitals.   Muscle spasm in the cervical paraspinals and suboccipitals. Tenderness of the cervical paraspinals and suboccipitals.     Tenderness   Cervical Spine   Tenderness in the facet joint.     Additional Tenderness Details  Right upper middle facets tender  Tender over right 4th proximal metatarsal.    Neurological Testing     Sensation   Cervical/Thoracic   Left   Intact: light touch    Right   Intact: light touch    Active Range of Motion   Cervical/Thoracic Spine   Cervical    Flexion: WFL and with pain  Extension: 23 degrees with pain  Left lateral flexion: 25 degrees with pain  Right lateral flexion: 25 degrees with pain  Left rotation: 45 degrees with pain  Right rotation: 55 degrees with pain    Additional Active Range of Motion Details  Pain right mid to upper cervical with rotation, SB, flexion and ext.     Passive Range of Motion     Right Ankle/Foot    Dorsiflexion (ke): 7 degrees     Strength/Myotome Testing     Left Shoulder     Planes of Motion   Flexion: 4+     Right Shoulder     Planes of Motion   Flexion: 4 (limited by pain)     Left Elbow   Flexion: WFL  Extension: WFL    Right Elbow   Flexion: WFL  Extension: WFL    Left Wrist/Hand   Wrist extension: WFL  Wrist flexion: WFL    Right Wrist/Hand   Wrist extension: WFL  Wrist flexion: WFL    Ambulation     Comments   Walked in with CAM boot with antalgic gait on right with no crutches. Without the boot, he tends to stiffen his foot and curl his toes and walks with antalgic gait.       Dry Needle Location [20560 (1-2 muscles)/20561 (3 or more muscles)]   Location Depth (\") Comment   Right gr occipital n 1    Right subocc n (UT/SC) 0.5    Right C3 post cut 2 Very guarded          Was " going to to [place] but he was anxious and didn't want to pursue more. He was able to turn his head better immediately after                           Therapy Education/Self Care 80083   Details: Use 1 crutch on left  Couple of times a day, be out of boot and practice shifting weight onto right  Cervical retraction with gentle rotation   Given Home Exercise Program  symptom relief  mobility training   Progress: New   Education provided to:  Patient   Level of understanding Verbalized and Demonstrated   Timed Minutes          Total Timed Treatment:     0   mins  Total Time of Visit:            60   mins    ASSESSMENT/PLAN   GOALS:  Goals                                          Progress Note due by 7/3/21   STG by: 3 weeks Comments Date Status   Improve left ankle DF to 12 deg      Improve relaxation of right cervical muscle guarding      No right lateral hip pain            LTG by: 6 week      Full cervical AROM in all planes      NDI score of 10% or less      Walk with normal gait patter with no pain      Independent with HEP for flexibility and strengthening                      Assessment & Plan     Assessment  Impairments: abnormal gait, abnormal muscle tone, abnormal or restricted ROM, activity intolerance, lacks appropriate home exercise program, pain with function and weight-bearing intolerance  Assessment details: He appears to have 2 primary issues. His right foot pain and walking with the foot has flared his left lateral hip pain due to the leg length discrancy with the boot and his limping irritated a right lateral hip bursitis vs ITB friction syndrome. His right foot is very tender and painful with walking. The second Xray didn't show a fracture but he could still have a bone bruise or strain to the 4th MTP from the impact. His ankle has stiffened from being in the boot which would also change his gait. Regarding his neck, he is extremely guarded through his right mid to upper neck with very limited motion. I  think he can progress well on both fronts. He was told to not use the crutches, but he may benefit from using one for now while he weans off them.   Prognosis: good    Plan  Therapy options: will be seen for skilled physical therapy services  Planned modality interventions: dry needling, low level laser therapy and TENS  Planned therapy interventions: spinal/joint mobilization, soft tissue mobilization, manual therapy, strengthening, stretching, therapeutic activities, gait training, home exercise program and balance/weight-bearing training  Frequency: 3x week  Duration in visits: 18  Treatment plan discussed with: patient  Plan details: Focus early on right neck muscle relaxation. Assess effectiveness of the dry needling and see if worth pursuing (he has a needle phobia so this might not be best for him unless it really helps). Slowly improve neck mobility. On his right leg, work on right ankle joint mobility to improve DF and slowly wean from crutches and eventually boot as his mobility, pain and control improves. Progress HEP for the same.         PT SIGNATURE: Hernan Becerra, PT       Initial Certification  Certification Period: 9/2/2021  I certify that the therapy services are furnished while this patient is under my care.  The services outlined above are required by this patient, and will be reviewed every 90 days.     PHYSICIAN:     Dharmesh Berger, DO______________________________________DATE: _________     Please sign and return via fax to [phone].   Thank you so much for letting us work with Anthony. I appreciate your letting us work with your patients. If you have any questions or concerns, please don't hesitate to contact me.

## 2021-06-11 ENCOUNTER — TREATMENT (OUTPATIENT)
Dept: PHYSICAL THERAPY | Facility: CLINIC | Age: 51
End: 2021-06-11

## 2021-06-11 DIAGNOSIS — M79.671 RIGHT FOOT PAIN: ICD-10-CM

## 2021-06-11 DIAGNOSIS — S16.1XXA STRAIN OF NECK MUSCLE, INITIAL ENCOUNTER: Primary | ICD-10-CM

## 2021-06-11 DIAGNOSIS — M25.559 PAIN, HIP: ICD-10-CM

## 2021-06-11 PROCEDURE — 97140 MANUAL THERAPY 1/> REGIONS: CPT | Performed by: PHYSICAL THERAPIST

## 2021-06-11 NOTE — PROGRESS NOTES
Physical Therapy Treatment Note    Patient: Anthony Wong                                                                                     Visit Date: 2021  :     1970    Referring practitioner:    Dharmesh Berger DO  Date of Initial Visit:          Type: THERAPY  Noted: 2021    Patient seen for 2 sessions    Visit Diagnoses:    ICD-10-CM ICD-9-CM   1. Strain of neck muscle, initial encounter  S16.1XXA 847.0   2. Pain, hip  M25.559 719.45   3. Right foot pain  M79.671 729.5     SUBJECTIVE     Subjective He hurts more looking to his R. He felt a little better after the needling but not enough to warrant. His foot only hurts when he puts pressure on it.     PAIN: 710 > 7/10       OBJECTIVE     Objective      Manual Therapy     66460  Comments   STM w/ massage cream to R UTs & suboccipitals    IASTM w/ vibrating homedisc to R calf    IASTM w/ blue flexibar to R calf    IASTM w/ pink foam roller to R hip, gluteals, & ITB            Timed Minutes 42       Therapy Education/Self Care 48234   Details:    Given symptom relief   Progress: New   Education provided to:  Patient   Level of understanding Verbalized   Timed Minutes          Total Timed Treatment:     42   mins  Total Time of Visit:             42   mins         ASSESSMENT/PLAN     GOALS  Goals                                          Progress Note due by 7/3/21   STG by: 3 weeks Comments Date Status   Improve left ankle DF to 12 deg         Improve relaxation of right cervical muscle guarding  Very guarded and tender   2021     No right lateral hip pain                   LTG by: 6 week         Full cervical AROM in all planes         NDI score of 10% or less         Walk with normal gait patter with no pain         Independent with HEP for flexibility and strengthening               Assessment/Plan     ASSESSMENT: Today was Martínez's first visit since his initial evaluation. We focused primarily on decreasing his guarding and  educating patient on importance of mobility. He was very tender and guarded in his R suboccipitals and UTs. The vibrating homedisc provided a little too much sensation for him to tolerate. The flexibar was better but he still reported tenderness in his calf. His R hip was also very sensitive to IASTM w/ the soft pink roller. His numerical pain rating did not change post-session however he stated that some of today's activities felt good.     PLAN: Focus on desensitization and increasing R sided mobility.     Signature: Juliane Schaefer, PTA

## 2021-06-14 ENCOUNTER — TREATMENT (OUTPATIENT)
Dept: PHYSICAL THERAPY | Facility: CLINIC | Age: 51
End: 2021-06-14

## 2021-06-14 DIAGNOSIS — M79.671 RIGHT FOOT PAIN: ICD-10-CM

## 2021-06-14 DIAGNOSIS — S16.1XXA STRAIN OF NECK MUSCLE, INITIAL ENCOUNTER: Primary | ICD-10-CM

## 2021-06-14 DIAGNOSIS — M25.559 PAIN, HIP: ICD-10-CM

## 2021-06-14 PROCEDURE — 97140 MANUAL THERAPY 1/> REGIONS: CPT | Performed by: PHYSICAL THERAPIST

## 2021-06-14 NOTE — PROGRESS NOTES
"Physical Therapy Treatment Note    Patient: Anthony Wong                                                                                     Visit Date: 2021  :     1970    Referring practitioner:    Dharmesh Berger DO  Date of Initial Visit:          Type: THERAPY  Noted: 2021    Patient seen for 3 sessions    Visit Diagnoses:    ICD-10-CM ICD-9-CM   1. Strain of neck muscle, initial encounter  S16.1XXA 847.0   2. Pain, hip  M25.559 719.45   3. Right foot pain  M79.671 729.5     SUBJECTIVE     Subjective Patient stated his R knee was swollen following previous session. His R foot is unchanged from previous session. Stated he has felt a \"needle poking\" sensation and has N/T tingling through RUE, as if glove on hand.     PAIN: 7/10 (R foot), 5/10 (neck)        OBJECTIVE     Objective      Manual Therapy     08500  Comments   STM R UTs & suboccipitals    Cervical distraction w L lateral flexion    Cervical side glides L to R     IASTM w/ edge tool to R calf    R talocrural dorsal glide Grade 1               Timed Minutes 40       Therapy Education/Self Care 71487   Details: Demonstrated R UT stretch to perform at home    Given symptom relief   Progress: New   Education provided to:  Patient   Level of understanding Verbalized   Timed Minutes          Total Timed Treatment:     40   mins  Total Time of Visit:             40   mins         ASSESSMENT/PLAN     GOALS  Goals                                          Progress Note due by 7/3/21   STG by: 3 weeks Comments Date Status   Improve left ankle DF to 12 deg  Addressed today w gentle manual therapy  2021     Improve relaxation of right cervical muscle guarding  Patient will guarded and tender w RUE radicular symptoms, improved w manual therapy   2021     No right lateral hip pain                   LTG by: 6 week         Full cervical AROM in all planes         NDI score of 10% or less         Walk with normal gait patter with " no pain         Independent with HEP for flexibility and strengthening               Assessment/Plan     ASSESSMENT: Patient tolerated treatment well today, primarily focused on neck and R ankle/foot. His R UT and suboccipitals were very guarded and tender, responded well to STM. Cervical hypomobility noted when performing side glides, likely due to the prolonged muscle tightness and decreased ROM. Patient responded well to the edge tool on R calf, decreased muscle guarding noted. Patient was sensitive to grade 1 dorsal glides to R talocrural joint. Patient stated he has been attempting to walk w/o crutches, however limps when ambulating which has likely lead to the increased R knee pain. Encouraged patient to utilize 1 crutch to allow for more normal gait pattern. Has follow up with ortho in the next 2 days.     PLAN: Utilize manual therapy to improve cervical mobility. Desensitize R ankle to allow for improved tolerance to handling.      Signature: Tati Han PT, temporary permit # [id number]

## 2021-06-15 NOTE — PROGRESS NOTES
I have reviewed the notes, assessments, and/or procedures performed by Tati Han PT, I concur with her/his documentation of Anthony Wong.  The clinical instructor and/or supervising staff, Hernan Becerra PT, was present in clinic guiding the visit by approving, concurring, and confirming the skilled judgement for all services rendered.    Signature:  Hernan Becerra PT

## 2021-06-16 ENCOUNTER — TREATMENT (OUTPATIENT)
Dept: PHYSICAL THERAPY | Facility: CLINIC | Age: 51
End: 2021-06-16

## 2021-06-16 DIAGNOSIS — S16.1XXA STRAIN OF NECK MUSCLE, INITIAL ENCOUNTER: Primary | ICD-10-CM

## 2021-06-16 DIAGNOSIS — M25.559 PAIN, HIP: ICD-10-CM

## 2021-06-16 DIAGNOSIS — M79.671 RIGHT FOOT PAIN: ICD-10-CM

## 2021-06-16 PROCEDURE — 97140 MANUAL THERAPY 1/> REGIONS: CPT | Performed by: PHYSICAL THERAPIST

## 2021-06-16 PROCEDURE — 97032 APPL MODALITY 1+ESTIM EA 15: CPT | Performed by: PHYSICAL THERAPIST

## 2021-06-16 PROCEDURE — 97530 THERAPEUTIC ACTIVITIES: CPT | Performed by: PHYSICAL THERAPIST

## 2021-06-16 NOTE — PROGRESS NOTES
"Physical Therapy Treatment Note    Patient: Anthony Wong                                                                                     Visit Date: 2021  :     1970    Referring practitioner:    Dharmesh Berger DO  Date of Initial Visit:          Type: THERAPY  Noted: 2021    Patient seen for 4 sessions    Visit Diagnoses:    ICD-10-CM ICD-9-CM   1. Strain of neck muscle, initial encounter  S16.1XXA 847.0   2. Pain, hip  M25.559 719.45   3. Right foot pain  M79.671 729.5     SUBJECTIVE     Subjective He's \"coming back slowly\". He has restless nights still. He returns from his ortho apt, reporting he is \"healing nicely\" and the \"bone's growing back\". He returns for a follow-up in 3 weeks. He denies restriction at this time, \"just take it easy\". His dr told him that he could start to wean from the crutches, but he \"threw them both to the side\" as soon as he said that. He still has to wear the boot, but is hopeful he will be able to get rid of that after his follow-up in 3 weeks. He is just thankful he doesn't have to have a cast or surgery. He has started to get N/T in his arms to his fingers.    PAIN: 6/10 (R foot), 7/10 (back), 5/10 (neck)       OBJECTIVE     Objective      Manual Therapy     23069  Comments   IASTM w/ edge tool and STM with massage cream Prone, R gastroc, min restrictions noted   IASTM with pink foam roller, L SL Mod OP to R gluteals   Attempted STM with massage cream,  prone D/c d/t pain and sensitivity (reported being very ticklish)       Timed Minutes 18     Therapeutic Activities    08150 Comments   educ at length regarding proper posture, increasing postural awareness, and encouraged him to integrate proper posture throughout his day. Educated him on impingement/nerve entrapment and how this could lead to N/T Pt verbalized understanding and demonstrated proper posture during teach-back education   educ pt to roll frozen water bottle on the plantar surface of " "his foot for pain relief/desensitization Pt inquired about ice bath, therefore, I educ hhim on CBAN and clinical standard of 10 min tx to reduce risk of increasing his inflammation       Timed Minutes 10     Modalities Comments   Cold laser/estim combo to R lateral ft Deep-chronic-inflammation mode       Minutes 10     Therapy Education/Self Care 06798   Details: Demonstrated R UT stretch to perform at home    Given symptom relief   Progress: New   Education provided to:  Patient   Level of understanding Verbalized   Timed Minutes      Total Timed Treatment:     38   mins  Total Time of Visit:             40   mins         ASSESSMENT/PLAN     GOALS  Goals                                          Progress Note due by 7/3/21                                                          RECERT due by: 9/04/2021   STG by: 3 weeks Comments Date Status   Improve left ankle DF to 12 deg  Addressed today w gentle manual therapy  6/14/2021  ongoing   Improve relaxation of right cervical muscle guarding  Patient will guarded and tender w RUE radicular symptoms, improved w manual therapy   06/14/2021  ongoing   No right lateral hip pain  Pt reported more hip pain today, which we addressed with IASTM 6/16/2021  ongoing   LTG by: 6 week         Full cervical AROM in all planes      ongoing   NDI score of 10% or less      ongoing   Walk with normal gait patter with no pain      ongoing   Independent with HEP for flexibility and strengthening      ongoing     Assessment/Plan     ASSESSMENT: Today, pt reported pain more so in his R hip and foot, so we focused on this today. He has since returned from his follow-up apt with ortho reporting he is healing nicely. He is able to progressively wean from the crutches; however, he presented to the clinic today without AD and reports that he \"threw them both to the side\" after he was told this. Furthermore, he reports recent onset of N/T which radiates to his fingertips. As a result, I educated " him on how impingement/nerve entrapment d/t habitually poor posture will frequently result in radicular symptoms. I then educated him on proper posture and postural awareness throughout the day, encouraging him to implement this throughout his day as he is able.     PLAN: Assess long term reaction to laser stim and proceed accordingly. Address cervical mobility and guarding if needed. Introduce postural strengthening exercises to decrease radicular symptoms and also to reduce strain on his spine. Consider R ankle proprioception in NWB without his boot, if appropriate. Review and bolster his HEP as appropriate.     Signature: Nelli Sellers, BRYAN

## 2021-06-18 ENCOUNTER — TREATMENT (OUTPATIENT)
Dept: PHYSICAL THERAPY | Facility: CLINIC | Age: 51
End: 2021-06-18

## 2021-06-18 DIAGNOSIS — M79.671 RIGHT FOOT PAIN: ICD-10-CM

## 2021-06-18 DIAGNOSIS — S16.1XXA STRAIN OF NECK MUSCLE, INITIAL ENCOUNTER: Primary | ICD-10-CM

## 2021-06-18 DIAGNOSIS — M25.559 PAIN, HIP: ICD-10-CM

## 2021-06-18 PROCEDURE — 97112 NEUROMUSCULAR REEDUCATION: CPT | Performed by: PHYSICAL THERAPIST

## 2021-06-18 PROCEDURE — 97140 MANUAL THERAPY 1/> REGIONS: CPT | Performed by: PHYSICAL THERAPIST

## 2021-06-18 PROCEDURE — 97032 APPL MODALITY 1+ESTIM EA 15: CPT | Performed by: PHYSICAL THERAPIST

## 2021-06-18 NOTE — PROGRESS NOTES
"Physical Therapy Treatment Note    Patient: Anthony Wong                                                                                     Visit Date: 2021  :     1970    Referring practitioner:    Dharmesh Berger DO  Date of Initial Visit:          Type: THERAPY  Noted: 2021    Patient seen for 5 sessions    Visit Diagnoses:    ICD-10-CM ICD-9-CM   1. Strain of neck muscle, initial encounter  S16.1XXA 847.0   2. Pain, hip  M25.559 719.45   3. Right foot pain  M79.671 729.5     SUBJECTIVE     Subjective He's \"just not feeling good today.\" Hiis R hip is hurting really bad. His R hand has been tingling/tinder too.    PAIN: 8/10 (R hip and back), 6/10 (neck) > 7/10 (back) 6-7/10 (foot, following BAPS) 6/10 (hip)     OBJECTIVE     Objective      Manual Therapy     69022  Comments   STM with massage cream, prone Cervical paraspinals, suboccipitals, B UT -- TTP with swelling noted, also, TPs   IASTM with homedic, L SL R lumbosacral paraspinals and gluteals \"that really does feel good when I get that\"           Timed Minutes 34     Modalities Comments   Cold laser/estim combo to R lateral ft Deep-chronic-inflammation mode    tx completed as of today: 2   Cold laser estim combo to B UT/cervical kane Performed during neuro and therefore unbillable time of 10 mins    tx completed as of today: 1   Minutes 10     Neuromuscular Reeducation     34302 Comments   BAPS board (L2) A/P on R (seated) 2 x 10 ea -- \"pulling\" which corrected with better positioning as the board had slid forward; however, this did increase his pain slightly   BAPS board (L2) M/L on R (seated) 2 x 10 ea   BAPS board (L2) ankle circles on R (seated) 2 x 10 ea       Timed Minutes 10     Therapy Education/Self Care 99107   Details: Demonstrated R UT stretch to perform at home    Given symptom relief   Progress: New   Education provided to:  Patient   Level of understanding Verbalized   Timed Minutes      Total Timed Treatment:   " "  54   mins  Total Time of Visit:             55   mins         ASSESSMENT/PLAN     GOALS  Goals                                          Progress Note due by 7/3/21                                                          RECERT due by: 9/04/2021   STG by: 3 weeks Comments Date Status   Improve left ankle DF to 12 deg  Addressed today w gentle manual therapy  6/14/2021  ongoing   Improve relaxation of right cervical muscle guarding  Pt continued to report TTP, but was able to tolerate light pressure today. Guarding improved but soft tissue restrictions noted.   6/18/2021  ongoing   No right lateral hip pain  Pt reported more hip pain today, which we addressed with IASTM 6/16/2021  ongoing   LTG by: 6 week         Full cervical AROM in all planes      ongoing   NDI score of 10% or less      ongoing   Walk with normal gait patter with no pain  Pt still in boot, which interferes with normal gait pattern 6/18/2021  ongoing   Independent with [facility] for flexibility and strengthening      ongoing     Assessment/Plan     ASSESSMENT: Today, pt reported good results following laser stim last session so we re-utilized this today. He has been working to desensitize the plantar surface of his foot at home and was less sensitive to pressure today. He reported pain more in his R hip again today, which improved following manual techniques. During STM to his cervical paraspinals and B UT, he was less TTP today vs last session and was better able to tolerate light pressure. He presented with increased inflammation and pain in his neck as well, so we addressed this with modalities. Furthermore, we addressed his ankle proprioception in a NWB position today. At one point, the BAPS board slid forward which increased \"pulling\" pain in his ankle. Pt reported absolved symptoms when board was place with knee at 90 deg, therefore reducing strain on his foot.     PLAN: Assess long term reaction to laser stim to neck and proceed accordingly. " Continue laser stim to R foot, if needed. Also consider addressing plantar fascia restrictions as well as cervical mobility / guarding if needed. Introduce postural strengthening exercises to decrease radicular symptoms and also to reduce strain on his spine. Review and bolster his HEP to reflect postural strengthening as appropriate. Consider KT for postural correction to decrease strain on cervical spine, therefore reducing radicular symptoms and relieving pain.     Signature: Nelli Sellers, PTA

## 2021-06-21 ENCOUNTER — TREATMENT (OUTPATIENT)
Dept: PHYSICAL THERAPY | Facility: CLINIC | Age: 51
End: 2021-06-21

## 2021-06-21 DIAGNOSIS — M79.671 RIGHT FOOT PAIN: ICD-10-CM

## 2021-06-21 DIAGNOSIS — M25.559 PAIN, HIP: ICD-10-CM

## 2021-06-21 DIAGNOSIS — S16.1XXA STRAIN OF NECK MUSCLE, INITIAL ENCOUNTER: Primary | ICD-10-CM

## 2021-06-21 PROCEDURE — 97032 APPL MODALITY 1+ESTIM EA 15: CPT | Performed by: PHYSICAL THERAPIST

## 2021-06-21 PROCEDURE — 97110 THERAPEUTIC EXERCISES: CPT | Performed by: PHYSICAL THERAPIST

## 2021-06-21 PROCEDURE — 97140 MANUAL THERAPY 1/> REGIONS: CPT | Performed by: PHYSICAL THERAPIST

## 2021-06-21 NOTE — PROGRESS NOTES
Physical Therapy Treatment Note    Patient: Anthony Wong                                                                                     Visit Date: 2021  :     1970    Referring practitioner:    Dharmesh Berger DO  Date of Initial Visit:          Type: THERAPY  Noted: 2021    Patient seen for 6 sessions    Visit Diagnoses:    ICD-10-CM ICD-9-CM   1. Strain of neck muscle, initial encounter  S16.1XXA 847.0   2. Pain, hip  M25.559 719.45   3. Right foot pain  M79.671 729.5     SUBJECTIVE     Subjective His weekend wasn't bad. His foot is bothering him the most today. He's been putting cream on his neck. He will see his doctor on Friday and will get more cream. It helps just a little. He denies soreness/additional pain following last session.     PAIN: 5/10 (R hip and back), 5/10 (neck) 7/10 (foot) > 6/10      OBJECTIVE     Objective       Manual Therapy     02490  Comments   STM w/ massage cream to R plantar fascia    R TC mobilizations w/ intermittent distraction            Timed Minutes 10     Modalities Comments   Cold laser/estim combo to R lateral dorsum of foot Deep-chronic-inflammation mode    tx completed as of today: 3   Cold laser estim combo to B UTs 5 min each    tx completed as of today: 2   Minutes 15     Therapeutic Exercises    29037 Comments   R resisted DF against yellow can-do 2x10   R AROM eversion w/ therapist resistance to inhibit hip IR/ER 2x10   Seated UE phasic 2x10   Standing TB rows against yellow theratube 2x10       Timed Minutes 20     Therapy Education/Self Care 48153   Details:     Given symptom relief   Progress: Reinforced   Education provided to:  Patient   Level of understanding Verbalized   Timed Minutes      Total Timed Treatment:    45   mins  Total Time of Visit:            45   mins         ASSESSMENT/PLAN     GOALS  Goals                                          Progress Note due by 7/3/21                                                           RECERT due by: 9/04/2021   STG by: 3 weeks Comments Date Status   Improve left ankle DF to 12 deg Pt performed R resisted DF against yellow can-do. He did report feeling it up his leg.  6/21/2021  ongoing   Improve relaxation of right cervical muscle guarding  Pt continued to report TTP, but was able to tolerate light pressure today. Guarding improved but soft tissue restrictions noted.   6/18/2021  ongoing   No right lateral hip pain  Pt reported more hip pain today, which we addressed with IASTM 6/16/2021  ongoing   LTG by: 6 week         Full cervical AROM in all planes      ongoing   NDI score of 10% or less      ongoing   Walk with normal gait patter with no pain  Pt still in boot, which interferes with normal gait pattern 6/18/2021  ongoing   Independent with [facility] for flexibility and strengthening      ongoing     Assessment/Plan     ASSESSMENT: Today his foot was bothering him the mos. He had very little restriction in his plantar fascia and tolerated STM better than expected. LASERstim was utilized today as well to both his foot and his cervical musculature. Gentle foot/ankle and postural strengthening was introduced today. He did report a pulling up his R hamstrings during resisted DF. He denied increase in symptoms with postural strengthening. He felt like his shoulder ROM was limited during UE phasic however visually, they did not appear limited. He did have some struggle w/ TB rows as he would incorporate excessive wrist movements bilaterally.     PLAN: Continue to increase postural and R foot/ankle strength and endurance. Continue to address elevated pain levels and mobility as needed.     Signature: Juliane Schaefer, PTA

## 2021-06-23 ENCOUNTER — TREATMENT (OUTPATIENT)
Dept: PHYSICAL THERAPY | Facility: CLINIC | Age: 51
End: 2021-06-23

## 2021-06-23 DIAGNOSIS — S16.1XXA STRAIN OF NECK MUSCLE, INITIAL ENCOUNTER: Primary | ICD-10-CM

## 2021-06-23 DIAGNOSIS — M79.671 RIGHT FOOT PAIN: ICD-10-CM

## 2021-06-23 DIAGNOSIS — M25.559 PAIN, HIP: ICD-10-CM

## 2021-06-23 PROCEDURE — 97140 MANUAL THERAPY 1/> REGIONS: CPT | Performed by: PHYSICAL THERAPIST

## 2021-06-23 PROCEDURE — 97110 THERAPEUTIC EXERCISES: CPT | Performed by: PHYSICAL THERAPIST

## 2021-06-23 NOTE — PROGRESS NOTES
"Physical Therapy Treatment Note    Patient: Anthony Wong                                                                                     Visit Date: 2021  :     1970    Referring practitioner:    Dharmesh Berger DO  Date of Initial Visit:          Type: THERAPY  Noted: 2021    Patient seen for 7 sessions    Visit Diagnoses:    ICD-10-CM ICD-9-CM   1. Strain of neck muscle, initial encounter  S16.1XXA 847.0   2. Pain, hip  M25.559 719.45   3. Right foot pain  M79.671 729.5     SUBJECTIVE     Subjective His neck hurt following last session, He is unsure whether it was d/t the laser tx. However, the laser has never hurt him prior. He reports no restrictions per dr beside wear the boot and \"take it easy\".    PAIN: 8/10 (neck), 6/10 (hip and foot) > 6/10 (neck and hip), 5/10 (\"my foot really feel better, I'm for real, it really do. It shocked me though, for real\". His foot feels different, a good different)     OBJECTIVE     Objective       Manual Therapy     58453  Comments   DMR/STM with massage cream, prone B UT, scales, suboccipitals, and cervical kane   Thoracic/CT PA's, prone Gr 3   Attempted R great toe mob into ext D/c d/t pain; very hypomobile, little ROM noted       Timed Minutes 20     Therapeutic Exercises    26804 Comments   B UE phasic  2 x 10 -- added to HEP   Attempted chin tucks but d/c d/t incr HA    Standing rows at [employer] with YTB  2 x 10 -- VCs to prevent resisted wrist ext    R resisted DF with YTB 2 x 10   R resisted eversion with YTB 4 x 5   R resisted inversion with YTB 4 x 5   Ankle pumps Added to HEP       Timed Minutes 23     Therapy Education/Self Care 42651   Details: See below, postural awareness / importance of neutral neck   Given Home Exercise Program  [employer] HEP (access code [email or web address])  postural retraining  symptom relief   Progress: Reinforced   Education provided to:  Patient   Level of understanding Verbalized   Timed Minutes      Access Code: " [email or web address]  Date: 06/23/2021  Prepared by: Nelli Sellers    Exercises  Seated Ankle Pumps - 1 x daily - 7 x weekly - 2 sets - 10 reps  Seated B UE Phasic - 1 x daily - 7 x weekly - 2 sets - 10 reps    Total Timed Treatment:    43   mins  Total Time of Visit:            45   mins         ASSESSMENT/PLAN     GOALS  Goals                                          Progress Note due by 7/3/21                                                          RECERT due by: 9/04/2021   STG by: 3 weeks Comments Date Status   Improve left ankle DF to 12 deg Pt performed R resisted DF against yellow can-do. He did report feeling it up his leg.  6/21/2021  ongoing   Improve relaxation of right cervical muscle guarding  Pt continued to report TTP, but was able to tolerate light pressure today. Guarding improved but soft tissue restrictions noted.   6/18/2021  ongoing   No right lateral hip pain  Pt reported more hip pain today, which we addressed with IASTM 6/16/2021  ongoing   LTG by: 6 week         Full cervical AROM in all planes      ongoing   NDI score of 10% or less      ongoing   Walk with normal gait patter with no pain  Pt still in boot, which interferes with normal gait pattern 6/18/2021  ongoing   Independent with HEP for flexibility and strengthening  bolstered today to reflect postural strengthening and ankle ROM in NWB position (see educ table) 6/23/2021  ongoing     Assessment/Plan     ASSESSMENT: He presented to the clinic with increased neck pain, which improved following manual techniques today. We followed this with postural strengthening. He did well with scapular retraction in sitting, but demonstrated difficulty with directives for standing rows; therefore, we bolstered his HEP to reflect B UE phasic vs standing rows. Additionally, pt reported his R foot felt better following light strengthening exercises. He was very eager to participate in these exercises at home. However, while his motivation is appreciated and  will serve him well later in his POC, he was encouraged to perform exercises solely as prescribed to reduce risk of exacerbated pain resulting from compensations. Moreover, he demonstrated very significantly decreased ROM throughout his R great toe, with report of pain increase with gentle passive extension. This affects his gait, specifically toe off; however, we will address this more when he is cleared to ambulate without his boot.     PLAN: Continue to progress postural and R foot/ankle strength and endurance with light resistance, reinforcing his HEP bolstered this date. Continue to address elevated pain levels and mobility with manual therapies and modalities as needed.    Signature: Nelli Sellers, PTA

## 2021-06-25 ENCOUNTER — TREATMENT (OUTPATIENT)
Dept: PHYSICAL THERAPY | Facility: CLINIC | Age: 51
End: 2021-06-25

## 2021-06-25 DIAGNOSIS — S16.1XXA STRAIN OF NECK MUSCLE, INITIAL ENCOUNTER: Primary | ICD-10-CM

## 2021-06-25 DIAGNOSIS — M79.671 RIGHT FOOT PAIN: ICD-10-CM

## 2021-06-25 DIAGNOSIS — M25.559 PAIN, HIP: ICD-10-CM

## 2021-06-25 PROCEDURE — 97140 MANUAL THERAPY 1/> REGIONS: CPT | Performed by: PHYSICAL THERAPIST

## 2021-06-25 PROCEDURE — 97110 THERAPEUTIC EXERCISES: CPT | Performed by: PHYSICAL THERAPIST

## 2021-06-25 NOTE — PROGRESS NOTES
Physical Therapy Treatment Note    Patient: Anthony Wong                                                                                     Visit Date: 2021  :     1970    Referring practitioner:    Dharmesh Berger DO  Date of Initial Visit:          Type: THERAPY  Noted: 2021    Patient seen for 8 sessions    Visit Diagnoses:    ICD-10-CM ICD-9-CM   1. Strain of neck muscle, initial encounter  S16.1XXA 847.0   2. Pain, hip  M25.559 719.45   3. Right foot pain  M79.671 729.5     SUBJECTIVE     Subjective Pt reports that he is feeling alright and is getting a little better.    PAIN: 5/10 (neck), 6/10 (hip and foot) > 6/10 (neck and hip), 5/10 neck, 5 foot     OBJECTIVE     Objective       Manual Therapy     [id number]  Comments   STM B UT, calenes, SCM, suboccipitals    Thoracic/CT PA's, prone Gr 3           Timed Minutes 20     Therapeutic Exercises    60470 Comments   Prone I 2 x 10    Prone T 2 x 10   Seated towel scrunches  2 x 10   R resisted DF, PF, inversion and eversion with YTB 1 x 10   Seated rows with RTB 2 x 10   Shuttle press with boot on 3 bands 2 x 10   Calf raises on shuttle press 3 bands 2 x 10   SA wall slides with foam roller 2 x 10   Timed Minutes 40     Therapy Education/Self Care 42165   Details:    Given Home Exercise Program  [employer] HEP (access code [email or web address])  postural retraining  symptom relief   Progress: Reinforced   Education provided to:  Patient   Level of understanding Verbalized   Timed Minutes      Access Code: [email or web address]  Date: 2021  Prepared by: Nelli Sellers    Exercises  Seated Ankle Pumps - 1 x daily - 7 x weekly - 2 sets - 10 reps  Seated B UE Phasic - 1 x daily - 7 x weekly - 2 sets - 10 reps    Total Timed Treatment:    60   mins  Total Time of Visit:            60   mins         ASSESSMENT/PLAN     GOALS  Goals                                          Progress Note due by 7/3/21                                                          RECERT  due by: 9/04/2021   STG by: 3 weeks Comments Date Status   Improve left ankle DF to 12 deg Pt performed R resisted DF against yellow can-do. He did report feeling it up his leg.  6/21/2021  ongoing   Improve relaxation of right cervical muscle guarding  Pt continued to report TTP, but was able to tolerate light pressure today. Guarding improved but soft tissue restrictions noted.   6/18/2021  ongoing   No right lateral hip pain  Pt reported more hip pain today, which we addressed with IASTM 6/16/2021  ongoing   LTG by: 6 week         Full cervical AROM in all planes  cervical ROM limited, was guarded initially but did improve  6/25/21  ongoing   NDI score of 10% or less      ongoing   Walk with normal gait patter with no pain  Pt still in boot, which interferes with normal gait pattern 6/18/2021  ongoing   Independent with HEP for flexibility and strengthening  bolstered today to reflect postural strengthening and ankle ROM in NWB position (see educ table) 6/23/2021  ongoing     Assessment/Plan     ASSESSMENT: His pain was decreased as opposed to last session so we were able to progress with postural strengthening as well as strengthening of the lower extremities. He tolerated this activity well without an increase in pain. He did have difficulty coordinating rows requiring both verbal and tactile cues. Activity on the shuttle press went well strengthening the lower extremities without putting to much stress on the foot.    PLAN: Continue to work on postural strengthening as well working on foot and ankle strength.    Signature: Dirk Talamantes, PT DPT

## 2021-06-28 ENCOUNTER — TREATMENT (OUTPATIENT)
Dept: PHYSICAL THERAPY | Facility: CLINIC | Age: 51
End: 2021-06-28

## 2021-06-28 DIAGNOSIS — M79.671 RIGHT FOOT PAIN: ICD-10-CM

## 2021-06-28 DIAGNOSIS — M25.559 PAIN, HIP: ICD-10-CM

## 2021-06-28 DIAGNOSIS — S16.1XXA STRAIN OF NECK MUSCLE, INITIAL ENCOUNTER: Primary | ICD-10-CM

## 2021-06-28 PROCEDURE — 97110 THERAPEUTIC EXERCISES: CPT | Performed by: PHYSICAL THERAPIST

## 2021-06-28 PROCEDURE — 97140 MANUAL THERAPY 1/> REGIONS: CPT | Performed by: PHYSICAL THERAPIST

## 2021-07-06 ENCOUNTER — TREATMENT (OUTPATIENT)
Dept: PHYSICAL THERAPY | Facility: CLINIC | Age: 51
End: 2021-07-06

## 2021-07-06 DIAGNOSIS — M79.671 RIGHT FOOT PAIN: ICD-10-CM

## 2021-07-06 DIAGNOSIS — S16.1XXA STRAIN OF NECK MUSCLE, INITIAL ENCOUNTER: Primary | ICD-10-CM

## 2021-07-06 DIAGNOSIS — M25.559 PAIN, HIP: ICD-10-CM

## 2021-07-06 PROCEDURE — 97140 MANUAL THERAPY 1/> REGIONS: CPT | Performed by: PHYSICAL THERAPIST

## 2021-07-06 NOTE — PROGRESS NOTES
Physical Therapy Treatment Note    Patient: Anthony Wong                                                                                     Visit Date: 2021  :     1970    Referring practitioner:    Dharmesh Berger DO  Date of Initial Visit:          Type: THERAPY  Noted: 2021    Patient seen for 10 sessions    Visit Diagnoses:    ICD-10-CM ICD-9-CM   1. Strain of neck muscle, initial encounter  S16.1XXA 847.0   2. Pain, hip  M25.559 719.45   3. Right foot pain  M79.671 729.5     SUBJECTIVE     Subjective He says he is getting better. He feels like he is about 50% improved since starting PT. He went to the orthopod who told him the foot is healing and the bone is filling in.. He goes back to the MD next week.     PAIN: 3-4/10 (neck/R hip), 4-5/10 (foot)       OBJECTIVE     Objective       Manual Therapy     [id number]  Comments   STM B UT, scalenes, SCM, suboccipitals Reported decrease in HA    Thoracic/CT PA's, prone Gr 3   Supine upglide facet mobs Gr 3, 2 large cavitations on right, felt better after   TC manual distraction and passive DF stretch    Timed Minutes 45       Therapy Education/Self Care 35814   Details:    Given Home Exercise Program  [employer] HEP (access code [email or web address])  postural retraining  symptom relief   Progress: Reinforced   Education provided to:  Patient   Level of understanding Verbalized   Timed Minutes      Access Code: [email or web address]  Date: 2021  Prepared by: Nelli Sellers    Exercises  Seated Ankle Pumps - 1 x daily - 7 x weekly - 2 sets - 10 reps  Seated B UE Phasic - 1 x daily - 7 x weekly - 2 sets - 10 reps    Total Timed Treatment:    45   mins  Total Time of Visit:            45   mins         ASSESSMENT/PLAN     GOALS  Goals                                          Progress Note due by 21                                                          RECERT due by: 2021   STG by: 3 weeks Comments Date Status   Improve right ankle DF to 12 deg R ankle  8  deg passively  7/1/2021  ongoing   Improve relaxation of right cervical muscle guarding After cavitation, he was able to turn his head easier 7/1/2021  ongoing   No right lateral hip pain  R hip pain is 5/10 today, stated it has decreased since starting therapy however it is constant 6/28/2021  ongoing   LTG by: 6 week         Full cervical AROM in all planes R lateral flexion: 32 deg  L lateral flexion: 40 deg   R rotation: 42 deg  L rotation: 49 deg  Pain w ROM   6/25/21  ongoing   NDI score of 10% or less  26 today compared to 34 at eval  6/28/2021  ongoing   Walk with normal gait patter with no pain  Patient is still in walking boot and demonstrates an antalgic gait pattern  7/1/2021  ongoing   Independent with HEP for flexibility and strengthening Patient reported compliance with HEP  6/28/2021  ongoing     Assessment & Plan            ASSESSMENT: He felt much better after his neck popped. He said he needed that. Hopefully, it unlocked the stiffness in his neck so the muscles can relax more. His ankle is still tight into DF so I encouraged more stretching into DF throughout the day.     PLAN: Send in for more visits through authorization. Cont to improve neck mobility and ankle mobility and weaning off CAM boot as the orthopedic doctor advises.     Signature: Hernan Becerra, PT

## 2021-07-09 ENCOUNTER — TREATMENT (OUTPATIENT)
Dept: PHYSICAL THERAPY | Facility: CLINIC | Age: 51
End: 2021-07-09

## 2021-07-09 DIAGNOSIS — S16.1XXA STRAIN OF NECK MUSCLE, INITIAL ENCOUNTER: Primary | ICD-10-CM

## 2021-07-09 DIAGNOSIS — M79.671 RIGHT FOOT PAIN: ICD-10-CM

## 2021-07-09 DIAGNOSIS — M25.559 PAIN, HIP: ICD-10-CM

## 2021-07-09 PROCEDURE — 97110 THERAPEUTIC EXERCISES: CPT | Performed by: PHYSICAL THERAPIST

## 2021-07-09 PROCEDURE — 97140 MANUAL THERAPY 1/> REGIONS: CPT | Performed by: PHYSICAL THERAPIST

## 2021-07-09 NOTE — PROGRESS NOTES
"Physical Therapy Treatment Note    Patient: Anthony Wong                                                                                     Visit Date: 2021  :     1970    Referring practitioner:    Dharmesh Berger DO  Date of Initial Visit:          Type: THERAPY  Noted: 2021    Patient seen for 11 sessions    Visit Diagnoses:    ICD-10-CM ICD-9-CM   1. Strain of neck muscle, initial encounter  S16.1XXA 847.0   2. Pain, hip  M25.559 719.45   3. Right foot pain  M79.671 729.5     SUBJECTIVE     Subjective He sees his dr on Tuesday of next week and will discuss his progress with him. His L foot has been \"hurting him like crazy\". On Tuesday, they popped his neck and \"it felt so good\" and he hasn't had much neck pain since.     PAIN: 5-6/10 (foot, R hip) > 5.5/10 (neck) (\"it's getting better. Slowly\")     OBJECTIVE     Objective       Manual Therapy     39894  Comments   Cervical PA's  Gr 3   Cervical T/D with SB     Suboccipital release with intermittent T/D    Supine passive cervical rotation         Timed Minutes 15     Therapeutic Exercises    94160 Comments   R assisted DF with gait belt    R ankle resisted DF with RTB 2 x 10 -- progressed from RTB   B SL clamshells against RTB 2 x 10 ea   Cervical rotation AROM  R: 57 deg and L: 42 deg       Timed Minutes 27     Therapy Education/Self Care 34462   Details:    Given Home Exercise Program  [employer] HEP (access code [email or web address])  postural retraining  symptom relief   Progress: Reinforced   Education provided to:  Patient   Level of understanding Verbalized   Timed Minutes      Access Code: [email or web address]  Date: 2021  Prepared by: Nelli Sellers    Exercises  Seated Ankle Pumps - 1 x daily - 7 x weekly - 2 sets - 10 reps  Seated B UE Phasic - 1 x daily - 7 x weekly - 2 sets - 10 reps    Total Timed Treatment:    43   mins  Total Time of Visit:            45   mins         ASSESSMENT/PLAN     GOALS  Goals                                       "    Progress Note due by 7/28/21                                                          RECERT due by: 9/04/2021   STG by: 3 weeks Comments Date Status   Improve right ankle DF to 12 deg R ankle 8 deg passively  7/1/2021  ongoing   Improve relaxation of right cervical muscle guarding After cavitation, he was able to turn his head easier 7/1/2021  ongoing   No right lateral hip pain  R hip pain is 5/10 today, stated it has decreased since starting therapy however it is constant 6/28/2021  ongoing   LTG by: 6 week         Full cervical AROM in all planes 6/25/2021:  R lat flex: 32 deg, L latl flex: 40 deg   R rotation: 42 deg, L rotation: 49 deg     7/9/2021:  R: 57 deg and L: 42 deg 7/9/2021  ongoing   NDI score of 10% or less  26 today compared to 34 at eval  6/28/2021  ongoing   Walk with normal gait patter with no pain  Patient is still in walking boot and demonstrates an antalgic gait pattern  7/1/2021  ongoing   Independent with HEP for flexibility and strengthening Patient reported compliance with HEP  6/28/2021  ongoing     Assessment/Plan     ASSESSMENT: Pt presents today reporting significant improvement in neck pain following cavitations last session. He reports his neck pain is nearly obsolete, but is now having pain in his L foot. He returns to his physician on Tuesday (6/13/2021) for a follow-up and plans to discuss his recent onset of pain in his L foot with him at that time. He demonstrated cervical rotation AROM (R) 57 deg, which improved from 34 deg pre-manual therapies today.    PLAN: Follow-up with results of dr sharp on 6/13/2021. Continue to progress hip, core, and postural strengthening as well as cervical and ankle mobility. Progress to more WB exercises if he is allowed to forego his boot.     Signature: Nelli Sellers, PTA

## 2021-07-29 ENCOUNTER — TREATMENT (OUTPATIENT)
Dept: PHYSICAL THERAPY | Facility: CLINIC | Age: 51
End: 2021-07-29

## 2021-07-29 DIAGNOSIS — M79.671 RIGHT FOOT PAIN: ICD-10-CM

## 2021-07-29 DIAGNOSIS — M25.559 PAIN, HIP: ICD-10-CM

## 2021-07-29 DIAGNOSIS — S16.1XXA STRAIN OF NECK MUSCLE, INITIAL ENCOUNTER: Primary | ICD-10-CM

## 2021-07-29 PROCEDURE — 97140 MANUAL THERAPY 1/> REGIONS: CPT

## 2021-07-29 PROCEDURE — 97110 THERAPEUTIC EXERCISES: CPT

## 2021-07-30 ENCOUNTER — TELEPHONE (OUTPATIENT)
Dept: PHYSICAL THERAPY | Facility: CLINIC | Age: 51
End: 2021-07-30

## 2021-07-30 ENCOUNTER — TREATMENT (OUTPATIENT)
Dept: PHYSICAL THERAPY | Facility: CLINIC | Age: 51
End: 2021-07-30

## 2021-07-30 DIAGNOSIS — S16.1XXA STRAIN OF NECK MUSCLE, INITIAL ENCOUNTER: Primary | ICD-10-CM

## 2021-07-30 DIAGNOSIS — M25.559 PAIN, HIP: ICD-10-CM

## 2021-07-30 DIAGNOSIS — M79.671 RIGHT FOOT PAIN: ICD-10-CM

## 2021-07-30 PROCEDURE — 97140 MANUAL THERAPY 1/> REGIONS: CPT

## 2021-07-30 PROCEDURE — 97110 THERAPEUTIC EXERCISES: CPT

## 2021-07-30 NOTE — TELEPHONE ENCOUNTER
Attempted to return pt's call regarding his concerns. I was unable to reach him at his provided cell phone number and a VM was not yet set up. Also attempted to reach him at his provided home number and left a message with his mother regarding request for patient to return the call. Provided his mother our phone number and available business hours as well.

## 2021-07-30 NOTE — TELEPHONE ENCOUNTER
"Called Mr. Connors's cell phone listed but there was no answer and no VM set up. I called his home number and spoke with his mother who said he has just come over there to fax a note to us. She reported he wanted line of a note done by Nelli tyler, saying that he didn't say this. His mother relayed that he felt like it would hurt his case. I told her that we \"don't have a dog in this fight\" and would have no reason to document something that wasn't accurate. I said that maybe there was some miscommunication between him and Nelli but that I didn't want to speak for her as I wasn't there and I don't know what was said. I told her that I would look at the fax and would talk with Nelli to get this clarified. (The fax was scanned and it attached to this note).     I spoke with Nelli and she said that the line in quotations (\"threw them both to the side\") was accurate that she has asked him to clarify. I advised that our goal is to have accurate notes and that if that is what he said, then it would be fraudulent to change it, but I advised her to call him and talk with him in case he was kidding or meant something else and that nuance was not conveyed on the note.     "

## 2021-08-04 ENCOUNTER — TREATMENT (OUTPATIENT)
Dept: PHYSICAL THERAPY | Facility: CLINIC | Age: 51
End: 2021-08-04

## 2021-08-04 DIAGNOSIS — M79.671 RIGHT FOOT PAIN: ICD-10-CM

## 2021-08-04 DIAGNOSIS — S16.1XXA STRAIN OF NECK MUSCLE, INITIAL ENCOUNTER: Primary | ICD-10-CM

## 2021-08-04 DIAGNOSIS — M25.559 PAIN, HIP: ICD-10-CM

## 2021-08-04 PROCEDURE — 97140 MANUAL THERAPY 1/> REGIONS: CPT

## 2021-08-04 PROCEDURE — 97110 THERAPEUTIC EXERCISES: CPT

## 2021-08-04 NOTE — PROGRESS NOTES
Physical Therapy Treatment Note    Patient: Anthony Wong                                                                                     Visit Date: 2021  :     1970    Referring practitioner:    Dharmesh Berger DO  Date of Initial Visit:          Type: THERAPY  Noted: 2021    Patient seen for 14 sessions    Visit Diagnoses:    ICD-10-CM ICD-9-CM   1. Strain of neck muscle, initial encounter  S16.1XXA 847.0   2. Pain, hip  M25.559 719.45   3. Right foot pain  M79.671 729.5     SUBJECTIVE     Subjective Patient has no complaints following previous session, mild R foot soreness reported.      PAIN: 5.5/10 (R foot)      OBJECTIVE     Objective       Manual Therapy     24579  Comments   R talocrural dorsal glide and distraction  Grade 1 - 2    R foot dorsal and plantar surface STM     R great toe extension mobs         Timed Minutes 15     Therapeutic Exercises    48875 Comments   Passive R ankle dorsiflexion and plantarflexion stretching     Seated R heel raise X 20; cue to roll over big toe    Tandem standing  30 sec x 3 each foot for ankle stability    Ankle ABCs    BAPs board seated DF/PF/inv/ev 2 x 10 each direction    Lateral weight shifting over RLE on wobble board  X 10    Shuttle press 3 bands   x 10; cue for equal weight bearing        Timed Minutes 20     Therapy Education/Self Care 89531   Details: Updated HEP, also encouraged patient to perform desensitization on R foot utilizing different textures such as a towel.    Given Home Exercise Program  [employer] HEP (access code [email or web address])  postural retraining  symptom relief   Progress: Reinforced   Education provided to:  Patient   Level of understanding Verbalized   Timed Minutes      Exercises  Seated Ankle Pumps - 1 x daily - 7 x weekly - 2 sets - 10 reps  Seated B UE Phasic - 1 x daily - 7 x weekly - 2 sets - 10 reps  Long Sitting Plantar Fascia Stretch with Towel - 1 x daily - 7 x weekly - 3 sets - 10 reps  Seated Heel Raise  - 1 x daily - 7 x weekly - 3 sets - 10 reps      Total Timed Treatment:    35  mins  Total Time of Visit:            35   mins         ASSESSMENT/PLAN     GOALS  Goals                                          Progress Note due by 8/28/21                                                          RECERT due by: 9/04/2021   STG by: 3 weeks Comments Date Status   Improve right ankle DF to 12 deg Addressed today with manual therapy and provided patient with HEP ankle activities  7/30  ongoing   Improve relaxation of right cervical muscle guarding Muscle guarding does not seem to be primary issue at this point. No trigger points observed today.  7/29/21 MET    No right lateral hip pain  Patient continues to have right hip pain, primarily with weight bearing  7/29/21  ongoing   LTG by: 6 weeks         Full cervical AROM in all planes R rotation: 45 deg and L rotation: 45 deg       7/29/2021  ongoing   NDI score of 10% or less  Left out due to therapist error, will assess when patient returns 7/29/21  ongoing   Walk with normal gait patter with no pain  Self awareness is improving, encouraged him to take shorter steps to limit limping  8/4/21 Partially met    Independent with HEP for flexibility and strengthening Updated HEP today   7/30/21  ongoing       Assessment/Plan     ASSESSMENT: Continuing to work on patient's R ankle mobility and tolerance to weight bearing. He presented with decreased ROM both passively and actively. Mobility does improve following mobilizations. His self awareness of gait mechanics is improving, required only a few cues to decrease stride length and limit limping. Patient asked about walking longer distances, encouraged him to maybe walk somewhere like the mall so that he can take seated breaks as needed. Overall, he still has limitations in mobility and with weight bearing however should progress so long as he continues to perform his HEP.  Patient's doctor recommended 2 more weeks of therapy  during patient's last follow up visit and patient feels ready to be done with his current PT plan of care. Next session is patient's last scheduled visit.     PLAN: Next session is patient's last session. Review HEP and update as needed. Continue to work on R ankle mobility and weightbearing.       Signature: Tati Han, PT, DPT

## 2021-08-06 ENCOUNTER — TREATMENT (OUTPATIENT)
Dept: PHYSICAL THERAPY | Facility: CLINIC | Age: 51
End: 2021-08-06

## 2021-08-06 DIAGNOSIS — M79.671 RIGHT FOOT PAIN: ICD-10-CM

## 2021-08-06 DIAGNOSIS — S16.1XXA STRAIN OF NECK MUSCLE, INITIAL ENCOUNTER: Primary | ICD-10-CM

## 2021-08-06 DIAGNOSIS — M25.559 PAIN, HIP: ICD-10-CM

## 2021-08-06 PROCEDURE — 97110 THERAPEUTIC EXERCISES: CPT | Performed by: PHYSICAL THERAPIST

## 2021-08-06 PROCEDURE — 97116 GAIT TRAINING THERAPY: CPT | Performed by: PHYSICAL THERAPIST

## 2021-08-06 NOTE — PROGRESS NOTES
"Physical Therapy Treatment Note    Patient: Anthony Wong                                                                                     Visit Date: 2021  :     1970    Referring practitioner:    Dharmesh Berger DO  Date of Initial Visit:          Type: THERAPY  Noted: 2021    Patient seen for 15 sessions    Visit Diagnoses:    ICD-10-CM ICD-9-CM   1. Strain of neck muscle, initial encounter  S16.1XXA 847.0   2. Pain, hip  M25.559 719.45   3. Right foot pain  M79.671 729.5     SUBJECTIVE     Subjective He reports his R foot is better, he's been doing his exercises.     PAIN: pre and post 5/10         OBJECTIVE     Objective      Therapeutic Exercises    87842 Comments   Seated B ankle pumps    Seated R DF stretches with sheet    UE phasic in sitting x 20     HL ball presses with 45 cm SB 2 x 10    R ankle 4 way with yellow T band and 6 inch bolster x 10 each                Timed Minutes 31     Gait Training 06996   Activity Video analysis of gait x 2 followed by \"strut walking\"   Gait Deviation Decreased L WS, decreased R SP, absent R UE swing.   Cueing Verbal, demo, video   Assistance none   Asst Device none   Distance Back zimmerman x 6   Timed Minutes 10     Therapy Education/Self Care 62086   Details:    Given Home Exercise Program   Progress: Reinforced   Education provided to:  Patient   Level of understanding Verbalized   Timed Minutes    Exercises  Seated Ankle Pumps - 1 x daily - 7 x weekly - 2 sets - 10 reps  Seated B UE Phasic - 1 x daily - 7 x weekly - 2 sets - 10 reps  Long Sitting Plantar Fascia Stretch with Towel - 1 x daily - 7 x weekly - 3 sets - 10 reps  Seated Heel Raise - 1 x daily - 7 x weekly - 3 sets - 10 reps         Total Timed Treatment:     41  mins  Total Time of Visit:             41   mins         ASSESSMENT/PLAN     GOALS  Goals                                          Progress Note due " by 8/28/21                                                          RECERT due by: 9/04/2021   STG by: 3 weeks Comments Date Status   Improve right ankle DF to 12 deg Addressed today with manual therapy and provided patient with HEP ankle activities  7/30  ongoing   Improve relaxation of right cervical muscle guarding Muscle guarding does not seem to be primary issue at this point. No trigger points observed today.  7/29/21 MET    No right lateral hip pain  Patient continues to have right hip pain, primarily with weight bearing  7/29/21  ongoing   LTG by: 6 weeks         Full cervical AROM in all planes R rotation: 45 deg and L rotation: 45 deg       7/29/2021  ongoing   NDI score of 10% or less  Left out due to therapist error, will assess when patient returns 7/29/21  ongoing   Walk with normal gait patter with no pain  Self awareness is improving, encouraged him to take shorter steps to limit limping  8/4/21 Partially met    Independent with HEP for flexibility and strengthening Reviewed in it's entirety  today   7/30/21  ongoing         Assessment/Plan     ASSESSMENT: We reviewed all the components of his HEP today and he seemed to have a good grasp of each component. I encouraged compliance and explained the components as  Well. Video analysis of gait proved helpful and he was able to make gait pattern corrections.    PLAN: Place the POC on hold     Signature: Isauro Retana, PTA

## 2023-02-04 ENCOUNTER — HOSPITAL ENCOUNTER (EMERGENCY)
Facility: HOSPITAL | Age: 53
Discharge: HOME | End: 2023-02-05
Admitting: STUDENT IN AN ORGANIZED HEALTH CARE EDUCATION/TRAINING PROGRAM
Payer: MEDICARE

## 2023-02-04 ENCOUNTER — APPOINTMENT (OUTPATIENT)
Dept: GENERAL RADIOLOGY | Facility: HOSPITAL | Age: 53
End: 2023-02-04
Payer: MEDICARE

## 2023-02-04 DIAGNOSIS — M25.531 RIGHT WRIST PAIN: Primary | ICD-10-CM

## 2023-02-04 DIAGNOSIS — M25.511 ACUTE PAIN OF RIGHT SHOULDER: ICD-10-CM

## 2023-02-04 DIAGNOSIS — K08.89 PAIN, DENTAL: ICD-10-CM
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
## 2023-02-04 PROCEDURE — 73110 X-RAY EXAM OF WRIST: CPT

## 2023-02-04 PROCEDURE — 73030 X-RAY EXAM OF SHOULDER: CPT

## 2023-02-04 PROCEDURE — 99283 EMERGENCY DEPT VISIT LOW MDM: CPT

## 2023-02-04 PROCEDURE — 73130 X-RAY EXAM OF HAND: CPT

## 2023-02-04 PROCEDURE — 71046 X-RAY EXAM CHEST 2 VIEWS: CPT

## 2023-02-04 RX ORDER — HYDROCODONE BITARTRATE AND ACETAMINOPHEN 5; 325 MG/1; MG/1
1 TABLET ORAL ONCE
Status: COMPLETED | OUTPATIENT
Start: 2023-02-04 | End: 2023-02-05

## 2023-02-04 RX ORDER — KETOROLAC TROMETHAMINE 30 MG/ML
30 INJECTION, SOLUTION INTRAMUSCULAR; INTRAVENOUS ONCE
Status: COMPLETED | OUTPATIENT
Start: 2023-02-04 | End: 2023-02-05

## 2023-02-05 VITALS
WEIGHT: 265 LBS | DIASTOLIC BLOOD PRESSURE: 98 MMHG | OXYGEN SATURATION: 97 % | RESPIRATION RATE: 18 BRPM | BODY MASS INDEX: 34.01 KG/M2 | HEART RATE: 98 BPM | TEMPERATURE: 98 F | SYSTOLIC BLOOD PRESSURE: 161 MMHG | HEIGHT: 74 IN

## 2023-02-05 PROCEDURE — 96372 THER/PROPH/DIAG INJ SC/IM: CPT

## 2023-02-05 PROCEDURE — 25010000002 KETOROLAC TROMETHAMINE PER 15 MG: Performed by: STUDENT IN AN ORGANIZED HEALTH CARE EDUCATION/TRAINING PROGRAM

## 2023-02-05 RX ADMIN — HYDROCODONE BITARTRATE AND ACETAMINOPHEN 1 TABLET: 5; 325 TABLET ORAL at 00:12

## 2023-02-05 RX ADMIN — KETOROLAC TROMETHAMINE 30 MG: 30 INJECTION, SOLUTION INTRAMUSCULAR; INTRAVENOUS at 00:14

## 2023-02-05 NOTE — ED PROVIDER NOTES
"Subjective   History of Present Illness    Patient is a pleasant 52-year-old male who presents to ED with significant other.  Chief complaint is right shoulder and right wrist pain status post fall.  Patient describes that 3 days ago, he slipped on ice and fell with his right upper extremity extended behind him.  He reports that his wrist took some impact but then his shoulder took the brunt of it.  The following day, he noticed his tooth was hurting him but he denies any impact to that region.  He is unsure if that is associated.  He did note some discomfort as he points to the left lower chest wall as well.  He denies hemoptysis or shortness of breath.    Patient is right-hand-dominant.  He denies any loss sensation distally limited movement.  He has difficulty with full abduction.  He denies any previous right shoulder injury.  Patient has been trying to \"work his shoulder pain out\" without any relief.  With his persistent pain, came to ER to be further evaluated.  He denies any loss sensation but does complain of limited movement due to the pain.    Review of Systems   Constitutional: Positive for activity change.   HENT: Positive for dental problem.    Respiratory: Negative.    Cardiovascular: Negative.    Gastrointestinal: Negative.    Genitourinary: Negative.    Musculoskeletal: Negative.  Negative for back pain.   Skin: Negative.    Neurological: Negative.    Psychiatric/Behavioral: Negative.    All other systems reviewed and are negative.      Past Medical History:   Diagnosis Date   • Carpal tunnel syndrome, bilateral     upper limbs   • Chronic back pain    • Cigarette nicotine dependence, uncomplicated    • Hypertension    • Obesity    • Overweight        No Known Allergies    Past Surgical History:   Procedure Laterality Date   • CLUB FOOT RELEASE     • KNEE ARTHROSCOPY Right        No family history on file.    Social History     Socioeconomic History   • Marital status: Single   Tobacco Use   • " "Smoking status: Every Day     Packs/day: 1.00     Types: Cigarettes   Substance and Sexual Activity   • Alcohol use: No   • Drug use: No   • Sexual activity: Defer       Prior to Admission medications    Medication Sig Start Date End Date Taking? Authorizing Provider   albuterol sulfate  (90 Base) MCG/ACT inhaler Inhale 2 puffs Every 4 (Four) Hours As Needed for Wheezing.    ProviderEriberto MD   amLODIPine (NORVASC) 10 MG tablet Take 10 mg by mouth.    ProviderEriberto MD   Elastic Bandages & Supports (WRIST SPLINT) [facility] [place]     ProviderEriberto MD   naproxen (EC NAPROSYN) 500 MG EC tablet Take 1 tablet by mouth 2 (Two) Times a Day As Needed for Mild Pain . 5/8/21   Arnaldo Guan MD   oxyCODONE-acetaminophen (PERCOCET) 7.5-325 MG per tablet Take 1 tablet by mouth Every 4 (Four) Hours As Needed for Moderate Pain (4-6). 6/6/17   Sonya Bains APRN       Medications   ketorolac (TORADOL) injection 30 mg (has no administration in time range)   HYDROcodone-acetaminophen (NORCO) 5-325 MG per tablet 1 tablet (1 tablet Oral Given 2/5/23 0012)       /99 (BP Location: Left arm, Patient Position: Sitting)   Pulse 111   Temp 98.8 °F (37.1 °C) (Oral)   Resp 20   Ht 188 cm (74\")   Wt 120 kg (265 lb)   SpO2 97%   BMI 34.02 kg/m²       Objective   Physical Exam  Vitals reviewed.   Constitutional:       Appearance: He is well-developed.   HENT:      Head: Atraumatic.      Mouth/Throat:      Mouth: Mucous membranes are moist.      Dentition: Abnormal dentition. Dental caries present.      Pharynx: Oropharynx is clear.        Comments: Extensive poor dentition on the left lower molar region where he is tender without any gingival abnormality.  Eyes:      Extraocular Movements: Extraocular movements intact.   Cardiovascular:      Rate and Rhythm: Normal rate and regular rhythm.      Heart sounds: Normal heart sounds.   Pulmonary:      Effort: Pulmonary effort is normal.      Breath " sounds: Normal breath sounds.   Abdominal:      General: Abdomen is flat. Bowel sounds are normal.      Palpations: Abdomen is soft.   Musculoskeletal:      Right shoulder: Tenderness present.      Left shoulder: Normal.      Right upper arm: Normal.      Left upper arm: Normal.      Right elbow: Normal.      Left elbow: Normal.      Right forearm: Normal.      Left forearm: Normal. No swelling.      Right wrist: Tenderness and snuff box tenderness present. No swelling.      Left wrist: Normal.      Right hand: Normal.      Left hand: Normal.      Comments: Patient does have point tenderness to right posterior glenohumeral space with pain reproducible with full abduction.  Patient is nontender on the scaphoid itself.    Patient is nontender on the elbow and is fully able to extend and flex without any difficulty.    He does have point tenderness in his right anatomical snuffbox.  Normal range of motion otherwise palpable pulses bilaterally.   Skin:     General: Skin is warm.      Capillary Refill: Capillary refill takes less than 2 seconds.   Neurological:      General: No focal deficit present.      Mental Status: He is alert and oriented to person, place, and time.   Psychiatric:         Mood and Affect: Mood normal.         Behavior: Behavior normal.         Procedures         Lab Results (last 24 hours)     ** No results found for the last 24 hours. **          No results found.    ED Course  ED Course as of 02/05/23 0012   Sat Feb 04, 2023   [id number] Patient has been educated that his x-ray did not show any obvious fracture but he does have point tenderness to his right glenohumeral space that Alman go ahead and put him in a splint and to follow-up with a hand surgeon.  In regards to his dental pain, recommend follow-up with a dentist or oral surgeon.  No gingival abscess or signs with Donte's angina noted.  Patient be discharged in stable condition. [TK]      ED Course User Index  [TK] Kelsy Garibay,  PA          MDM    Final diagnoses:   Right wrist pain   Acute pain of right shoulder   Fall, initial encounter   Pain, dental          Kelsy Garibay PA  02/05/23 0012

## 2023-02-05 NOTE — ED NOTES
Patient has HTN, states he missed his dose of medicine today and will be taking it when he gets home